# Patient Record
Sex: FEMALE | Race: WHITE | NOT HISPANIC OR LATINO | Employment: FULL TIME | URBAN - METROPOLITAN AREA
[De-identification: names, ages, dates, MRNs, and addresses within clinical notes are randomized per-mention and may not be internally consistent; named-entity substitution may affect disease eponyms.]

---

## 2023-12-07 ENCOUNTER — OFFICE VISIT (OUTPATIENT)
Dept: NEUROSURGERY | Facility: CLINIC | Age: 73
End: 2023-12-07
Payer: COMMERCIAL

## 2023-12-07 VITALS
HEART RATE: 78 BPM | WEIGHT: 120 LBS | HEIGHT: 62 IN | SYSTOLIC BLOOD PRESSURE: 185 MMHG | BODY MASS INDEX: 22.08 KG/M2 | RESPIRATION RATE: 18 BRPM | DIASTOLIC BLOOD PRESSURE: 99 MMHG

## 2023-12-07 DIAGNOSIS — M54.50 LOW BACK PAIN, UNSPECIFIED BACK PAIN LATERALITY, UNSPECIFIED CHRONICITY, UNSPECIFIED WHETHER SCIATICA PRESENT: ICD-10-CM

## 2023-12-07 DIAGNOSIS — M48.061 LUMBAR FORAMINAL STENOSIS: Primary | ICD-10-CM

## 2023-12-07 DIAGNOSIS — M41.9 SCOLIOSIS, UNSPECIFIED SCOLIOSIS TYPE, UNSPECIFIED SPINAL REGION: ICD-10-CM

## 2023-12-07 DIAGNOSIS — G89.29 OTHER CHRONIC BACK PAIN: ICD-10-CM

## 2023-12-07 DIAGNOSIS — M54.16 LUMBAR RADICULOPATHY, CHRONIC: ICD-10-CM

## 2023-12-07 DIAGNOSIS — M54.9 OTHER CHRONIC BACK PAIN: ICD-10-CM

## 2023-12-07 DIAGNOSIS — M41.55 SCOLIOSIS OF THORACOLUMBAR REGION DUE TO DEGENERATIVE DISEASE OF SPINE IN ADULT: ICD-10-CM

## 2023-12-07 PROCEDURE — 99205 OFFICE O/P NEW HI 60 MIN: CPT | Performed by: NEUROLOGICAL SURGERY

## 2023-12-07 PROCEDURE — 99215 OFFICE O/P EST HI 40 MIN: CPT | Performed by: NEUROLOGICAL SURGERY

## 2023-12-07 PROCEDURE — 1159F MED LIST DOCD IN RCRD: CPT | Performed by: NEUROLOGICAL SURGERY

## 2023-12-07 PROCEDURE — 1036F TOBACCO NON-USER: CPT | Performed by: NEUROLOGICAL SURGERY

## 2023-12-07 PROCEDURE — 1125F AMNT PAIN NOTED PAIN PRSNT: CPT | Performed by: NEUROLOGICAL SURGERY

## 2023-12-07 RX ORDER — LANOLIN ALCOHOL/MO/W.PET/CERES
5000 CREAM (GRAM) TOPICAL
COMMUNITY

## 2023-12-07 RX ORDER — PANTOPRAZOLE SODIUM 40 MG/1
40 TABLET, DELAYED RELEASE ORAL
COMMUNITY

## 2023-12-07 RX ORDER — PREGABALIN 75 MG/1
75 CAPSULE ORAL NIGHTLY
COMMUNITY
Start: 2023-11-14 | End: 2024-04-17 | Stop reason: HOSPADM

## 2023-12-07 RX ORDER — BRIMONIDINE TARTRATE AND TIMOLOL MALEATE 2; 5 MG/ML; MG/ML
1 SOLUTION OPHTHALMIC 2 TIMES DAILY
COMMUNITY
End: 2024-03-25 | Stop reason: ALTCHOICE

## 2023-12-07 RX ORDER — ATORVASTATIN CALCIUM 10 MG/1
10 TABLET, FILM COATED ORAL DAILY
COMMUNITY
Start: 2023-08-09

## 2023-12-07 RX ORDER — MELOXICAM 7.5 MG/1
7.5 TABLET ORAL 2 TIMES DAILY
COMMUNITY
Start: 2018-03-01 | End: 2024-04-17 | Stop reason: HOSPADM

## 2023-12-07 RX ORDER — BRIMONIDINE TARTRATE 2 MG/ML
SOLUTION/ DROPS OPHTHALMIC
COMMUNITY
Start: 2023-07-24 | End: 2024-04-11 | Stop reason: ENTERED-IN-ERROR

## 2023-12-07 RX ORDER — CALCIUM CARBONATE 600 MG
TABLET ORAL
COMMUNITY

## 2023-12-07 ASSESSMENT — ENCOUNTER SYMPTOMS
OCCASIONAL FEELINGS OF UNSTEADINESS: 0
LOSS OF SENSATION IN FEET: 1
DEPRESSION: 0

## 2023-12-07 ASSESSMENT — PAIN SCALES - GENERAL: PAINLEVEL: 4

## 2023-12-11 PROBLEM — M54.9 NOTALGIA: Status: ACTIVE | Noted: 2023-12-07

## 2023-12-11 PROBLEM — M54.16 LUMBAR RADICULOPATHY, CHRONIC: Status: ACTIVE | Noted: 2023-12-07

## 2023-12-11 PROBLEM — M41.55 SCOLIOSIS OF THORACOLUMBAR REGION DUE TO DEGENERATIVE DISEASE OF SPINE IN ADULT: Status: ACTIVE | Noted: 2023-12-07

## 2023-12-11 PROBLEM — M48.061 LUMBAR FORAMINAL STENOSIS: Status: ACTIVE | Noted: 2023-12-07

## 2023-12-21 ENCOUNTER — TELEPHONE (OUTPATIENT)
Dept: ORTHOPEDICS | Facility: HOSPITAL | Age: 73
End: 2023-12-21
Payer: COMMERCIAL

## 2023-12-21 NOTE — TELEPHONE ENCOUNTER
I spoke with Ms. Mcnair regarding the CT scan results from June 2022 and also the bone density test that was done in August 2023.  CT scan shows very low HU values of 94 and the bone density results shows evidence of osteoporosis with a T-score of -2.5.  The patient did not need a multilevel lumbar fusion at the least from L2 to pelvis and has significant scoliosis that may mandate extension of her fusion further upwards if her bone quality is going is not that good seems to be based on the CT scan and DEXA results.  Given that I discussed with her the fact that she would benefit from starting anabolic agents for her osteoporosis in the form of either Forteo or Evenity for about 3 to 4 months before surgery and continue that for about a year after the surgery to decrease the chances of instrumentation failure and major surgery related complications especially given the fact that the surgery is elective and she does not have any major neurological deficit at this point.  She is going to talk to her PCP to see if she can be started on anabolic agents for osteoporosis which is the treatment of choice for patient to her undergoing major thoracolumbar instrumentation with the poor bone quality.  We will be more than happy to proceed with surgery if she would want to understanding the risk of failure if we proceed without treating it.  She did not discuss this with her family and call my office and reschedule it if she would agree with starting the treatment before proceeding with surgery with anabolic agents as I would recommend.    I discussed with the patient again on phone couple of days back regarding her upcoming surgery and she has already seen her endocrinologist who has started her on Evenity at this point for osteoporosis.  I also reviewed her AP scoliosis films that shows scoliosis that is much worse but was visualized on previous imaging and she likely might require lower thoracic to pelvis instrumentation and  fusion.  Regardless at this point of time patient is agreeable to take the anabolic agents for few months before surgical intervention given the high risk of complication without.  Will schedule her for surgery in April and I will see her once before surgery likely March again and discussed with her slight change in surgical plan that we may have to consider given her deformity.    Pop Whelan MD, [facility]   of Neurological Surgery  [facility] [employer] [facility]  Attending Surgeon  Director - Minimally Invasive Spine Surgery  [place], [place]      ---Some of this note was completed using Dragon voice recognition technology and sometimes the software misinterprets words. This may include unintended errors with respect to translation of words, typographical errors or grammar errors which may not have been identified prior to finalization of the chart note. Please take this into account when reading this note---

## 2023-12-22 ENCOUNTER — ANCILLARY PROCEDURE (OUTPATIENT)
Dept: RADIOLOGY | Facility: CLINIC | Age: 73
End: 2023-12-22
Payer: COMMERCIAL

## 2023-12-22 DIAGNOSIS — M41.9 SCOLIOSIS, UNSPECIFIED SCOLIOSIS TYPE, UNSPECIFIED SPINAL REGION: ICD-10-CM

## 2023-12-22 PROCEDURE — 72131 CT LUMBAR SPINE W/O DYE: CPT

## 2023-12-22 PROCEDURE — 77080 DXA BONE DENSITY AXIAL: CPT

## 2023-12-22 PROCEDURE — 72081 X-RAY EXAM ENTIRE SPI 1 VW: CPT | Performed by: RADIOLOGY

## 2023-12-22 PROCEDURE — 77080 DXA BONE DENSITY AXIAL: CPT | Performed by: RADIOLOGY

## 2023-12-22 PROCEDURE — 72084 X-RAY EXAM ENTIRE SPI 6/> VW: CPT

## 2023-12-22 PROCEDURE — 72131 CT LUMBAR SPINE W/O DYE: CPT | Performed by: RADIOLOGY

## 2023-12-28 ENCOUNTER — TELEMEDICINE CLINICAL SUPPORT (OUTPATIENT)
Dept: PREADMISSION TESTING | Facility: HOSPITAL | Age: 73
End: 2023-12-28
Payer: COMMERCIAL

## 2024-01-12 ENCOUNTER — APPOINTMENT (OUTPATIENT)
Dept: PREADMISSION TESTING | Facility: HOSPITAL | Age: 74
End: 2024-01-12
Payer: COMMERCIAL

## 2024-01-17 DIAGNOSIS — M41.9 SCOLIOSIS, UNSPECIFIED SCOLIOSIS TYPE, UNSPECIFIED SPINAL REGION: Primary | ICD-10-CM

## 2024-02-14 ENCOUNTER — APPOINTMENT (OUTPATIENT)
Dept: NEUROSURGERY | Facility: HOSPITAL | Age: 74
End: 2024-02-14
Payer: COMMERCIAL

## 2024-03-07 ENCOUNTER — APPOINTMENT (OUTPATIENT)
Dept: NEUROSURGERY | Facility: CLINIC | Age: 74
End: 2024-03-07
Payer: COMMERCIAL

## 2024-03-07 ENCOUNTER — OFFICE VISIT (OUTPATIENT)
Dept: NEUROSURGERY | Facility: CLINIC | Age: 74
End: 2024-03-07
Payer: COMMERCIAL

## 2024-03-07 ENCOUNTER — HOSPITAL ENCOUNTER (OUTPATIENT)
Dept: RADIOLOGY | Facility: HOSPITAL | Age: 74
Discharge: HOME | End: 2024-03-07
Payer: COMMERCIAL

## 2024-03-07 VITALS
HEIGHT: 62 IN | SYSTOLIC BLOOD PRESSURE: 197 MMHG | DIASTOLIC BLOOD PRESSURE: 100 MMHG | RESPIRATION RATE: 20 BRPM | BODY MASS INDEX: 22.08 KG/M2 | HEART RATE: 75 BPM | WEIGHT: 120 LBS

## 2024-03-07 DIAGNOSIS — M54.16 LUMBAR RADICULOPATHY, CHRONIC: ICD-10-CM

## 2024-03-07 DIAGNOSIS — M41.55 SCOLIOSIS OF THORACOLUMBAR REGION DUE TO DEGENERATIVE DISEASE OF SPINE IN ADULT: Primary | ICD-10-CM

## 2024-03-07 DIAGNOSIS — M41.9 SCOLIOSIS, UNSPECIFIED SCOLIOSIS TYPE, UNSPECIFIED SPINAL REGION: ICD-10-CM

## 2024-03-07 PROCEDURE — 99215 OFFICE O/P EST HI 40 MIN: CPT | Performed by: NEUROLOGICAL SURGERY

## 2024-03-07 PROCEDURE — 72082 X-RAY EXAM ENTIRE SPI 2/3 VW: CPT

## 2024-03-07 PROCEDURE — 72082 X-RAY EXAM ENTIRE SPI 2/3 VW: CPT | Performed by: RADIOLOGY

## 2024-03-07 PROCEDURE — 1159F MED LIST DOCD IN RCRD: CPT | Performed by: NEUROLOGICAL SURGERY

## 2024-03-07 PROCEDURE — 1036F TOBACCO NON-USER: CPT | Performed by: NEUROLOGICAL SURGERY

## 2024-03-07 PROCEDURE — 1125F AMNT PAIN NOTED PAIN PRSNT: CPT | Performed by: NEUROLOGICAL SURGERY

## 2024-03-07 NOTE — PROGRESS NOTES
It was a pleasure to see Ms. Xu kee at the Neurosurgery Spine Clinic at [facility]. She is a really nice 73 y.o. female who presents to us with complaints LOW BACK PAIN radiating to both hips. The leg pain and tingling are gone. Has pain going up left side and left foot is numb.    I saw her in the past for her lumbar spinal stenosis and degenerative scoliosis and recommended surgery in the form of decompression and osteotomy with L2 to pelvis instrumented fusion.  Unfortunately her bone quality was not optimal and I recommended her to start anabolic agent and she has been on Evenity over the past couple of months.  She has already undergone 1 dose of the same and is scheduled to get another dose in a day or 2 and should likely receive the third dose before her surgery that is scheduled for epidural.  She is here to discuss surgery further and to update on clinical condition.    She mentioned that she continues to have pain involving the low back and bilateral lower extremities especially along the buttocks and has numbness and tingling in the foot.  She also feels that she is leaning slightly to the left.  However overall she denies any new symptoms beyond what she already had when I saw her in the past.      Constitutional: No fever or chills  Respiratory: No shortness of breath or wheezing  Musculoskeletal: see HPI above   Neurologic: See HPI above    There were no vitals filed for this visit.  NEURO: Neurologically patient is awake alert and oriented X 3    No obvious cranial nerve deficit.  Strength is almost 5/5 throughout all motor groups tested with no asymmetric significant motor deficit.  Deep tendon reflexes are symmetric throughout.   Gait : Walks with a hunched forward posture secondary to pain and the gait is very antalgic.  Also has a slightly leftward tilt on standing upright.  Sensory examination is intact to touch and painful stimuli.    MRI of the lumbar spine done in November 2023  was reviewed personally and shows presence of significant degenerative changes from L2-3 down to L5-S1 in the form of disc degeneration at L2-3 with mild disc bulges but more significant to be presence of severe right L3-4 lateral recess and foraminal stenosis and left L4-5 and left L5-S1 there is severe lateral recess stenosis and foraminal stenosis secondary to degenerative changes most specifically being severe facet hypertrophy.  There is evidence of degenerative scoliosis.  AP and lateral x-ray of the lumbar spine done in thousand 22 were also reviewed and shows presence of degenerative scoliosis is specially presence of left-sided lumbosacral fractional curve measuring about 15 degrees from L3-S1.  More specifically there is evidence of her almost complete disc collapse with degeneration at L3-4 L4-5 L5-S1 with severe foraminal stenosis at all the levels.  Her lumbar lordosis measures about 42 degrees which seems to be in alignment with the pelvic incidence measures about 48 degrees.  There is no evidence of any significant sagittal deformity.     AP and lateral conservative scoliosis films that were done after her last visit with me and today were reviewed personally and shows evidence of ability stalking scoliosis more than what was visualized on the lumbar x-rays with about 43 degrees of Hurd angle measured between L1 and L5 with about 22 degrees of L5-S1 fractional curve.  The convexity of the pelvis to the left.  There is clear evidence of left-sided coronal malalignment.  Measuring about 3 cm.    PLAN:  Ashley Mcnair is a really nice 73 y.o. female who is already very well-known to me who I evaluated in the past for her significant low back and lower extremity pain.  I recommended surgery for her during her last visit and she was already on schedule few months back but unfortunately due to her osteoporosis I had to start him on anabolic agents to optimize him overall bone health to improve her outcome  from surgery.I already reviewed I also ordered a scoliosis films for her today and she is here to review the films and to discuss the surgery again.  .  Her surgery already has been approved by the insurance and from L2 to pelvis fusion with osteotomy and decompression which I believe is appropriate.  The only code that was denied was use of BMP and I did discuss with her the pros and cons of the same.  We will decide intraoperatively if he would need to use BMP or not.    I have reviewed imaging and diagnosis with the patient and her family during her to the visit today, discussed the natural history of the disease and both non-operative and operative treatments available and rationale vs risks for both.   I specifically discussed again with her the scoliosis films that clearly shows a much worse scoliosis than which was visualized on the previous x-rays.  Hence given the extent of the scoliosis we will extend her fusion up to T10 but other than that the surgery would pretty much remained the same as I recommended to her in the past.     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  She is already taking Evenity and she will have completed 3 doses of the same before her surgery that is scheduled on April 12.       I have explained the surgical procedure in detail, duration of surgery ( 6-8 hrs ) with expected duration and extent of recovery along risks of surgery that include, but is not limited to bleeding, infection, blood vessel injury or damage,loss  of sensation, loss of bladder, bowel or sexual function, nerve injury/damage resulting in weakness/paralysis, malunion, nonunion, CSF leak, brachial plexus injury, peripheral vision blindness, injury to the abdominal contents, failure of implants/fusion, failure to relieve symptoms, recurrent disease, adjacent segment disease, need to reoperate for any reason and general anesthesia reaction such as stroke, coma, heart attack, delirium, confusion, death as well as worsening of preexisted medical conditions and any other unforeseen complication related to unrelated to the spinal procedure per se.  Specifically discussed were implant related failures such as carlos fracture and Proximal Junctional Kyphosis (PJK) and proximal junctional failure (PJF) with possibility of requiring revision surgery in the form of extension of fusion if that were to happen especially symptomatic up to an extent of about 15 % - 20 %.  The limitations secondary to stiffness in the spine despite a successful surgery from long segment fusion impacting day to day life was also discussed.   A Shared decision was made to proceed with surgery after involving the patient and her family in the treatment decision-making process.  They clearly expressed understanding of possible risks and benefits of surgery and the realistic expectations of surgery along with the fact that the goal of the surgery is to improve the  overall functioning and quality of life by improvement of the current level of function,  possible improvement and/or prevent progression of preexisting neurological deficits and not necessarily 100 % pain relief. There is no guarantee that the prexisiting deficits will improve definitely after surgery. The option of continued non-operative management was very clearly discussed as well with its associated risks and benefits and the patient was clearly provided that option.       It was a pleasure to participate in Ms. Mcnair clinical care.  All questions were answered to her satisfaction and she explained understanding of the further treatment plan.     We will proceed with surgery on April 12 as planned.      Pop Whelan MD, [facility]   of Neurological Surgery  [facility] [employer]  Attending Surgeon  Director - Minimally Invasive Spine Surgery  [place], [place]      ---Some of this note was completed using Dragon voice recognition technology and sometimes the software misinterprets words. This may include unintended errors with respect to translation of words, typographical errors or grammar errors which may not have been identified prior to finalization of the chart note. Please take this into account when reading this note---

## 2024-03-25 ENCOUNTER — PRE-ADMISSION TESTING (OUTPATIENT)
Dept: PREADMISSION TESTING | Facility: HOSPITAL | Age: 74
End: 2024-03-25
Payer: COMMERCIAL

## 2024-03-25 ENCOUNTER — HOSPITAL ENCOUNTER (OUTPATIENT)
Dept: CARDIOLOGY | Facility: HOSPITAL | Age: 74
Discharge: HOME | End: 2024-03-25
Payer: COMMERCIAL

## 2024-03-25 VITALS
BODY MASS INDEX: 21.42 KG/M2 | HEIGHT: 63 IN | TEMPERATURE: 98.7 F | DIASTOLIC BLOOD PRESSURE: 82 MMHG | WEIGHT: 120.9 LBS | HEART RATE: 81 BPM | SYSTOLIC BLOOD PRESSURE: 150 MMHG | OXYGEN SATURATION: 98 %

## 2024-03-25 DIAGNOSIS — Z01.818 PREOPERATIVE EXAMINATION: Primary | ICD-10-CM

## 2024-03-25 DIAGNOSIS — E78.5 HYPERLIPIDEMIA, UNSPECIFIED HYPERLIPIDEMIA TYPE: ICD-10-CM

## 2024-03-25 DIAGNOSIS — M41.9 SCOLIOSIS, UNSPECIFIED SCOLIOSIS TYPE, UNSPECIFIED SPINAL REGION: ICD-10-CM

## 2024-03-25 DIAGNOSIS — Z01.818 PREOPERATIVE EXAMINATION: ICD-10-CM

## 2024-03-25 LAB
ABO GROUP (TYPE) IN BLOOD: NORMAL
ANION GAP SERPL CALC-SCNC: 13 MMOL/L (ref 10–20)
ANTIBODY SCREEN: NORMAL
APTT PPP: 40 SECONDS (ref 27–38)
BUN SERPL-MCNC: 21 MG/DL (ref 6–23)
CALCIUM SERPL-MCNC: 10.2 MG/DL (ref 8.6–10.6)
CHLORIDE SERPL-SCNC: 102 MMOL/L (ref 98–107)
CO2 SERPL-SCNC: 30 MMOL/L (ref 21–32)
CREAT SERPL-MCNC: 0.52 MG/DL (ref 0.5–1.05)
EGFRCR SERPLBLD CKD-EPI 2021: >90 ML/MIN/1.73M*2
ERYTHROCYTE [DISTWIDTH] IN BLOOD BY AUTOMATED COUNT: 12.9 % (ref 11.5–14.5)
EST. AVERAGE GLUCOSE BLD GHB EST-MCNC: 105 MG/DL
GLUCOSE SERPL-MCNC: 76 MG/DL (ref 74–99)
HBA1C MFR BLD: 5.3 %
HCT VFR BLD AUTO: 44.4 % (ref 36–46)
HGB BLD-MCNC: 14.5 G/DL (ref 12–16)
INR PPP: 1.1 (ref 0.9–1.1)
MCH RBC QN AUTO: 31.4 PG (ref 26–34)
MCHC RBC AUTO-ENTMCNC: 32.7 G/DL (ref 32–36)
MCV RBC AUTO: 96 FL (ref 80–100)
NRBC BLD-RTO: 0 /100 WBCS (ref 0–0)
PLATELET # BLD AUTO: 311 X10*3/UL (ref 150–450)
POTASSIUM SERPL-SCNC: 4.2 MMOL/L (ref 3.5–5.3)
PROTHROMBIN TIME: 12.2 SECONDS (ref 9.8–12.8)
RBC # BLD AUTO: 4.62 X10*6/UL (ref 4–5.2)
RH FACTOR (ANTIGEN D): NORMAL
SODIUM SERPL-SCNC: 141 MMOL/L (ref 136–145)
WBC # BLD AUTO: 5.5 X10*3/UL (ref 4.4–11.3)

## 2024-03-25 PROCEDURE — 36415 COLL VENOUS BLD VENIPUNCTURE: CPT

## 2024-03-25 PROCEDURE — 93005 ELECTROCARDIOGRAM TRACING: CPT

## 2024-03-25 PROCEDURE — 86901 BLOOD TYPING SEROLOGIC RH(D): CPT

## 2024-03-25 PROCEDURE — 85610 PROTHROMBIN TIME: CPT

## 2024-03-25 PROCEDURE — 99204 OFFICE O/P NEW MOD 45 MIN: CPT | Performed by: NURSE PRACTITIONER

## 2024-03-25 PROCEDURE — 87081 CULTURE SCREEN ONLY: CPT

## 2024-03-25 PROCEDURE — 93010 ELECTROCARDIOGRAM REPORT: CPT | Performed by: INTERNAL MEDICINE

## 2024-03-25 PROCEDURE — 85027 COMPLETE CBC AUTOMATED: CPT

## 2024-03-25 PROCEDURE — 80048 BASIC METABOLIC PNL TOTAL CA: CPT

## 2024-03-25 PROCEDURE — 83036 HEMOGLOBIN GLYCOSYLATED A1C: CPT

## 2024-03-25 RX ORDER — CHLORHEXIDINE GLUCONATE 40 MG/ML
SOLUTION TOPICAL 2 TIMES DAILY
Qty: 473 ML | Refills: 0 | Status: SHIPPED | OUTPATIENT
Start: 2024-03-25 | End: 2024-03-30

## 2024-03-25 RX ORDER — CHLORHEXIDINE GLUCONATE ORAL RINSE 1.2 MG/ML
SOLUTION DENTAL
Qty: 473 ML | Refills: 0 | Status: SHIPPED | OUTPATIENT
Start: 2024-03-25 | End: 2024-04-17 | Stop reason: HOSPADM

## 2024-03-25 ASSESSMENT — DUKE ACTIVITY SCORE INDEX (DASI)
CAN YOU DO YARD WORK LIKE RAKING LEAVES, WEEDING OR PUSHING A MOWER: YES
CAN YOU RUN A SHORT DISTANCE: NO
CAN YOU DO MODERATE WORK AROUND THE HOUSE LIKE VACUUMING, SWEEPING FLOORS OR CARRYING GROCERIES: YES
CAN YOU TAKE CARE OF YOURSELF (EAT, DRESS, BATHE, OR USE TOILET): YES
CAN YOU CLIMB A FLIGHT OF STAIRS OR WALK UP A HILL: YES
CAN YOU WALK A BLOCK OR TWO ON LEVEL GROUND: YES
CAN YOU WALK INDOORS, SUCH AS AROUND YOUR HOUSE: YES
CAN YOU HAVE SEXUAL RELATIONS: YES
CAN YOU DO HEAVY WORK AROUND THE HOUSE LIKE SCRUBBING FLOORS OR LIFTING AND MOVING HEAVY FURNITURE: NO
DASI METS SCORE: 6.3
CAN YOU PARTICIPATE IN MODERATE RECREATIONAL ACTIVITIES LIKE GOLF, BOWLING, DANCING, DOUBLES TENNIS OR THROWING A BASEBALL OR FOOTBALL: NO
TOTAL_SCORE: 28.7
CAN YOU DO LIGHT WORK AROUND THE HOUSE LIKE DUSTING OR WASHING DISHES: YES
CAN YOU PARTICIPATE IN STRENOUS SPORTS LIKE SWIMMING, SINGLES TENNIS, FOOTBALL, BASKETBALL, OR SKIING: NO

## 2024-03-25 ASSESSMENT — LIFESTYLE VARIABLES: SMOKING_STATUS: NONSMOKER

## 2024-03-25 ASSESSMENT — ENCOUNTER SYMPTOMS
CONSTITUTIONAL NEGATIVE: 1
RESPIRATORY NEGATIVE: 1
CARDIOVASCULAR NEGATIVE: 1
NECK NEGATIVE: 1
NUMBNESS: 1
GASTROINTESTINAL NEGATIVE: 1
MUSCULOSKELETAL NEGATIVE: 1

## 2024-03-25 NOTE — H&P (VIEW-ONLY)
"CPM/PAT Evaluation       Name: Ashley Mcnair (Ashley Mcnair \"Alysa\")  /Age: 1950/73 y.o.     Visit Type:   In-Person       Chief Complaint: lumbar spinal stenosis scheduled for preoperative visit    HPI  Patient is a 72yo F with scheduled L3-L4 laminectomy and facetectomy for decompression, L4-L5 posterior column osteotomy, L5-S1 laminectomy and facetectomy for decompression  with L4-5 and L5-S1 posterior lumbar interbody fusion with bone autograft and BMP with titanium interbody cage placement and L2 S1 instrumentation and fusion with pelvic fixation on 2024 for treatment of lumbar spinal stenosis.  The patient is referred by Dr. Pop Davis for osteoarthritis, fibromyalgia, GERD, hyperlipidemia, hypertension, remote nephrolithiasis, postoperative nausea and vomiting, spinal stenosis scheduled for surgery.    Past Medical History:   Diagnosis Date    Arthritis     Back pain     Right L3-L4 Transforaminal Epidural Steroid injection under fluoroscopic guidance on 23    Chronic pain disorder     Corneal abrasion     Degenerative scoliosis     Fibromyalgia, primary     controlled on mobic    GERD (gastroesophageal reflux disease)     managed with protonix    History of shingles     Hyperlipidemia     f/w pcp, managed with Lipitor    Hypertension     f/w pcp, not currently on medication    Nephrolithiasis     passed naturally    PONV (postoperative nausea and vomiting)     Radiculopathy     Scheduled for surgery with Dr. Whelan on 24    Retinal detachment     Shingles     Spinal stenosis     Tinnitus of left ear     Vertigo     Vision loss     left eye d/t injury       Past Surgical History:   Procedure Laterality Date    ADENOIDECTOMY       SECTION, LOW TRANSVERSE      x3 , ,     EYE SURGERY Left     pupil repair    FOOT Right     HYSTERECTOMY      TONSILLECTOMY         Patient Sexual activity questions deferred to the physician.    Family History   Problem " Relation Name Age of Onset    No Known Problems Mother      Heart disease Father         Allergies   Allergen Reactions    Sulfa (Sulfonamide Antibiotics) Swelling       Prior to Admission medications    Medication Sig Start Date End Date Taking? Authorizing Provider   atorvastatin (Lipitor) 10 mg tablet Take 1 tablet (10 mg) by mouth once daily. 8/9/23  Yes Historical Provider, MD   brimonidine (AlphaGAN) 0.2 % ophthalmic solution INSTILL 1 DROP INTO THE LEFT EYE TWICE A DAY 7/24/23  Yes Historical Provider, MD   calcium carbonate 600 mg calcium (1,500 mg) tablet 1 (one) time each day at the same time.   Yes Historical Provider, MD   calcium citrate (Calcitrate) 200 mg (950 mg) tablet Take 1 tablet (200 mg) by mouth once daily.   Yes Historical Provider, MD   cranberry 400 mg capsule Take by mouth.   Yes Historical Provider, MD   cyanocobalamin (Vitamin B-12) 1,000 mcg tablet Take 5 tablets (5,000 mcg) by mouth once daily.   Yes Historical Provider, MD   meloxicam (Mobic) 7.5 mg tablet Take 1 tablet (7.5 mg) by mouth twice a day. 3/1/18  Yes Historical Provider, MD   milk thistle 175 mg tablet Take 1 tablet (175 mg) by mouth once daily.   Yes Historical Provider, MD   pantoprazole (ProtoNix) 40 mg EC tablet Take 1 tablet (40 mg) by mouth once daily.   Yes Historical Provider, MD   pregabalin (Lyrica) 25 mg capsule Take 1 capsule (25 mg) by mouth once daily. 11/14/23 3/25/24 Yes Historical Provider, MD   chlorhexidine (Hibiclens) 4 % external liquid Apply topically 2 times a day for 5 days. 3/25/24 3/30/24  Samantha A Meeson, APRN-CNP   chlorhexidine (Peridex) 0.12 % solution Swish and spit 15 mL night before surgery and morning of surgery 3/25/24   Samantha A Meeson, APRN-CNP   romosozumab-aqqg (Evenity) 105 mg/1.17 mL syringe Inject under the skin.    Historical Provider, MD   brimonidine-timoloL (Combigan) 0.2-0.5 % ophthalmic solution Administer 1 drop into affected eye(s) twice a day.  3/25/24  Historical  Provider, MD LOPEZ ROS:   Constitutional:   neg    Neuro/Psych:    numbness  Eyes:    use of corrective lenses (contacts)  Ears:   neg    Nose:   neg    Mouth:   neg    Throat:   neg    Neck:   neg    Cardio:   neg    Respiratory:   neg    Endocrine:   GI:   neg    :   neg    Musculoskeletal:   neg    Hematologic:   neg    Skin:  neg        Physical Exam  Vitals reviewed.   Constitutional:       Appearance: Normal appearance.   HENT:      Head: Normocephalic.      Mouth/Throat:      Mouth: Mucous membranes are dry.   Eyes:      Conjunctiva/sclera: Conjunctivae normal.   Neck:      Vascular: No carotid bruit.   Cardiovascular:      Rate and Rhythm: Normal rate and regular rhythm.      Pulses: Normal pulses.      Heart sounds: Normal heart sounds.   Pulmonary:      Effort: Pulmonary effort is normal.      Breath sounds: Normal breath sounds.   Abdominal:      Palpations: Abdomen is soft.      Tenderness: There is no abdominal tenderness.   Musculoskeletal:         General: Normal range of motion.      Cervical back: Normal range of motion.      Right lower leg: No edema.      Left lower leg: No edema.   Lymphadenopathy:      Cervical: No cervical adenopathy.   Skin:     General: Skin is warm and dry.      Capillary Refill: Capillary refill takes less than 2 seconds.   Neurological:      General: No focal deficit present.      Mental Status: She is alert and oriented to person, place, and time.   Psychiatric:         Mood and Affect: Mood normal.         Behavior: Behavior normal.         Thought Content: Thought content normal.         Judgment: Judgment normal.          PAT AIRWAY:   Airway:     Mallampati::  I    Neck ROM::  Full  normal        Visit Vitals  /82 Comment: manual recheck   Pulse 81   Temp 37.1 °C (98.7 °F)       DASI Risk Score      Flowsheet Row Most Recent Value   DASI SCORE 28.7   METS Score (Will be calculated only when all the questions are answered) 6.3          Caprini DVT  Assessment      Flowsheet Row Most Recent Value   DVT Score 9   Current Status Major surgery planned, lasting over 3 hours   History Prior major surgery   Age 60-75 years   BMI 30 or less          Modified Frailty Index      Flowsheet Row Most Recent Value   Modified Frailty Index Calculator 0          CHADS2 Stroke Risk         N/A 3 - 100%: High Risk   2 - 3%: Medium Risk   0 - 2%: Low Risk     Last Change: N/A          This score determines the patient's risk of having a stroke if the patient has atrial fibrillation.        This score is not applicable to this patient. Components are not calculated.          Revised Cardiac Risk Index      Flowsheet Row Most Recent Value   Revised Cardiac Risk Calculator 0          Apfel Simplified Score      Flowsheet Row Most Recent Value   Apfel Simplified Score Calculator 4          Risk Analysis Index Results This Encounter    No data found in the last 1 encounters.       Stop Bang Score      Flowsheet Row Most Recent Value   Do you snore loudly? 0   Do you often feel tired or fatigued after your sleep? 0   Has anyone ever observed you stop breathing in your sleep? 0   Do you have or are you being treated for high blood pressure? 0   Recent BMI (Calculated) 21.9   Is BMI greater than 35 kg/m2? 0=No   Age older than 50 years old? 1=Yes   Is your neck circumference greater than 17 inches (Male) or 16 inches (Female)? 0   Gender - Male 0=No   STOP-BANG Total Score 1              Assessment and Plan:     Neuro:  Fibromyalgia controlled with lyrica (continue)    the patient is at an increased risk for post operative delirium secondary to age >/= 65 and type and duration of surgery.  Preoperative brain exercise educational handout provided to patient.    The patient is at an increased risk for perioperative stroke secondary to increased age, HTN, HLD, female sex , general anesthesia, and op time >2.5 hours.     HEENT/Airway:  No diagnosis or significant findings on chart review or  clinical presentation and evaluation.     Cardiovascular:   HLD managed on atorvastatin.  Hypertension, patient not currently on medication, patient plans on seeing PCP on 3/27/2024, will call with medication recommendations.  EKG in office today NSR with RBBB. no additional preoperative testing is currently indicated.    Update 24-by PCP on 2024 was started on 50 mg of losartan daily.  Discussed with patient to keep track of blood pressure log and to call PCP if pressure continues to run high.  Instructed patient to hold losartan 24 hours prior to surgery.    METS are 6.3    RCRI  0 which is 3.9% % 30 day risk of MACE (risk for cardiac death, nonfatal myocardial infarction, and nonfactal cardiac arrest    CONNOR score which indicates a  0.1 % risk of intraoperative or 30-day postoperative MACE      Pulmonary:  No diagnosis however significant findings on chart review or clinical presentation and evaluation see below risk screening tools. Preoperative deep breathing educational handout provided to patient.    ARISCAT:   26   points which is a intermediate (13.3%) risk of in-hospital post-op pulmonary complications     PRODIGY:  12  points which is a intermediate risk of post op opioid induced respiratory depression episodes    STOP BAN  points which is a low risk for moderate to severe BERTO    Renal:  No diagnosis or significant findings on chart review or clinical presentation and evaluation, however, the patient is at increased risk of perioperative renal complications secondary to age>/= 56 and HTN. Preventative measures include preoperative hydration and BP control.    Endocrine:  No diagnosis or significant findings on chart review or clinical presentation and evaluation.    Hematologic:   No diagnosis or significant findings on chart review or clinical presentation and evaluation. Preoperative DVT educational handout provided to patient.    Caprini Score:  9  points which is a highest risk of  perioperative VTE    Gastrointestinal:   GERD controlled with diet and pantoprazole    EAT-10 score of      0- self-perceived oropharyngeal dysphagia scale (0-40)     Apfel: 4 points 79%% risk for post operative N/V    Infectious disease:  No diagnosis or significant findings on chart review or clinical presentation and evaluation.      Musculoskeletal:  OA currently on Mobic hold for 7 days prior to surgery; spinal stenosis scheduled for surgery     Other:   postoperative nausea and vomiting, consider antiemetic therapy      Labs ordered  Recent Results (from the past 168 hour(s))   CBC    Collection Time: 03/25/24  1:43 PM   Result Value Ref Range    WBC 5.5 4.4 - 11.3 x10*3/uL    nRBC 0.0 0.0 - 0.0 /100 WBCs    RBC 4.62 4.00 - 5.20 x10*6/uL    Hemoglobin 14.5 12.0 - 16.0 g/dL    Hematocrit 44.4 36.0 - 46.0 %    MCV 96 80 - 100 fL    MCH 31.4 26.0 - 34.0 pg    MCHC 32.7 32.0 - 36.0 g/dL    RDW 12.9 11.5 - 14.5 %    Platelets 311 150 - 450 x10*3/uL   Basic Metabolic Panel    Collection Time: 03/25/24  1:43 PM   Result Value Ref Range    Glucose 76 74 - 99 mg/dL    Sodium 141 136 - 145 mmol/L    Potassium 4.2 3.5 - 5.3 mmol/L    Chloride 102 98 - 107 mmol/L    Bicarbonate 30 21 - 32 mmol/L    Anion Gap 13 10 - 20 mmol/L    Urea Nitrogen 21 6 - 23 mg/dL    Creatinine 0.52 0.50 - 1.05 mg/dL    eGFR >90 >60 mL/min/1.73m*2    Calcium 10.2 8.6 - 10.6 mg/dL   Hemoglobin A1C    Collection Time: 03/25/24  1:43 PM   Result Value Ref Range    Hemoglobin A1C 5.3 see below %    Estimated Average Glucose 105 Not Established mg/dL   Type And Screen    Collection Time: 03/25/24  1:43 PM   Result Value Ref Range    ABO TYPE A     Rh TYPE POS     ANTIBODY SCREEN NEG    Coagulation Screen    Collection Time: 03/25/24  1:43 PM   Result Value Ref Range    Protime 12.2 9.8 - 12.8 seconds    INR 1.1 0.9 - 1.1    aPTT 40 (H) 27 - 38 seconds   Staphylococcus aureus/MRSA colonization, Culture    Collection Time: 03/25/24  1:43 PM     Specimen: Nares/Axilla/Groin; Swab   Result Value Ref Range    Staph/MRSA Screen Culture No Staphylococcus aureus isolated    ECG 12 Lead    Collection Time: 03/26/24  1:43 PM   Result Value Ref Range    Ventricular Rate 78 BPM    Atrial Rate 78 BPM    MN Interval 184 ms    QRS Duration 128 ms    QT Interval 410 ms    QTC Calculation(Bazett) 467 ms    P Axis 18 degrees    R Axis 1 degrees    T Axis 34 degrees    QRS Count 13 beats    Q Onset 219 ms    P Onset 127 ms    P Offset 178 ms    T Offset 424 ms    QTC Fredericia 447 ms

## 2024-03-25 NOTE — PREPROCEDURE INSTRUCTIONS
Thank you for visiting the [facility].  If you have any changes to your health condition, please call the surgeons office to alert them and give them details of your symptoms.    Samantha Meeson, MSN, NP-C  Adult-Gerontology Nurse Practitioner II  Department of Anesthesiology and Perioperative Medicine  Main phone [phone]  Direct phone [phone]  Fax [phone]    Preoperative Fasting Guidelines    Why must I stop eating and drinking near surgery time?  With sedation, food or liquid in your stomach can enter your lungs causing serious complications  Increases nausea and vomiting    When do I need to stop eating and drinking before my surgery?  Do not eat any food after midnight the night before your surgery/procedure.  You may have up to TEN ounces of clear liquid until TWO hours before your instructed arrival time to the hospital.  This includes water, black tea/coffee, (no milk or cream) apple juice, and electrolyte drinks (Gatorade)  You may chew gum until TWO hours before your surgery/procedure      Additional Instructions:     The Day before Surgery:  -Review your medication instructions, stop indicated medications  -You will be contacted in the evening regarding the time of your arrival to facility and surgery time    Day of Surgery:  -Review your medication instructions, take indicated medications  -Wear comfortable loose fitting clothing  -Do not use moisturizers, creams, lotions or perfume  -All jewelry and valuables should be left at home              Preoperative Brain Exercises    What are brain exercises?  A brain exercise is any activity that engages your thinking (cognitive) skills.    What types of activities are considered brain exercises?  Jigsaw puzzles, crossword puzzles, word jumble, memory games, word search, and many more.  Many can be found free online or on your phone via a mobile lucille.    Why should I do brain exercises before my surgery?  More recent  research has shown brain exercise before surgery can lower the risk of postoperative delirium (confusion) which can be especially important for older adults.  Patients who did brain exercises for 5 to 10 hours the days before surgery, cut their risk of postoperative delirium in half up to 1 week after surgery.                  [facility]    Preoperative Deep Breathing Exercises    Why it is important to do deep breathing exercises before my surgery?  Deep breathing exercises strengthen your breathing muscles.  This helps you to recover after your surgery and decreases the chance of breathing complications.      How are the deep breathing exercises done?  Sit straight with your back supported.  Breathe in deeply and slowly through your nose. Your lower rib cage should expand and your abdomen may move forward.  Hold that breath for 3 to 5 seconds.  Breathe out through pursed lips, slowly and completely.  Rest and repeat 10 times every hour while awake.  Rest longer if you become dizzy or lightheaded.                Patient and Family Education             Ways You Can Help Prevent Blood Clots             This handout explains some simple things you can do to help prevent blood clots.      Blood clots are blockages that can form in the body's veins. When a blood clot forms in your deep veins, it may be called a deep vein thrombosis, or DVT for short. Blood clots can happen in any part of the body where blood flows, but they are most common in the arms and legs. If a piece of a blood clot breaks free and travels to the lungs, it is called a pulmonary embolus (PE). A PE can be a very serious problem.         Being in the hospital or having surgery can raise your chances of getting a blood clot because you may not be well enough to move around as much as you normally do.         Ways you can help prevent blood clots in the hospital         Wearing SCDs. SCDs stands for Sequential Compression  Devices.   SCDs are special sleeves that wrap around your legs  They attach to a pump that fills them with air to gently squeeze your legs every few minutes.   This helps return the blood in your legs to your heart.   SCDs should only be taken off when walking or bathing.   SCDs may not be comfortable, but they can help save your life.               Wearing compression stockings - if your doctor orders them. These special snug fitting stockings gently squeeze your legs to help blood flow.       Walking. Walking helps move the blood in your legs.   If your doctor says it is ok, try walking the halls at least   5 times a day. Ask us to help you get up, so you don't fall.      Taking any blood thinning medicines your doctor orders.        Page 1 of 2     [facility]; 3/23   Ways you can help prevent blood clots at home       Wearing compression stockings - if your doctor orders them. ? Walking - to help move the blood in your legs.       Taking any blood thinning medicines your doctor orders.      Signs of a blood clot or PE      Tell your doctor or nurse know right away if you have of the problems listed below.    If you are at home, seek medical care right away. Call 911 for chest pain or problems breathing.               Signs of a blood clot (DVT) - such as pain,  swelling, redness or warmth in your arm or leg      Signs of a pulmonary embolism (PE) - such as chest     pain or feeling short of breath

## 2024-03-25 NOTE — CPM/PAT H&P
"CPM/PAT Evaluation       Name: Ashley Mcnair (Ashley Mcnair \"Alysa\")  /Age: 1950/73 y.o.     Visit Type:   In-Person       Chief Complaint: lumbar spinal stenosis scheduled for preoperative visit    HPI  Patient is a 74yo F with scheduled L3-L4 laminectomy and facetectomy for decompression, L4-L5 posterior column osteotomy, L5-S1 laminectomy and facetectomy for decompression  with L4-5 and L5-S1 posterior lumbar interbody fusion with bone autograft and BMP with titanium interbody cage placement and L2 S1 instrumentation and fusion with pelvic fixation on 2024 for treatment of lumbar spinal stenosis.  The patient is referred by Dr. Pop Davis for osteoarthritis, fibromyalgia, GERD, hyperlipidemia, hypertension, remote nephrolithiasis, postoperative nausea and vomiting, spinal stenosis scheduled for surgery.    Past Medical History:   Diagnosis Date    Arthritis     Back pain     Right L3-L4 Transforaminal Epidural Steroid injection under fluoroscopic guidance on 23    Chronic pain disorder     Corneal abrasion     Degenerative scoliosis     Fibromyalgia, primary     controlled on mobic    GERD (gastroesophageal reflux disease)     managed with protonix    History of shingles     Hyperlipidemia     f/w pcp, managed with Lipitor    Hypertension     f/w pcp, not currently on medication    Nephrolithiasis     passed naturally    PONV (postoperative nausea and vomiting)     Radiculopathy     Scheduled for surgery with Dr. Whelan on 24    Retinal detachment     Shingles     Spinal stenosis     Tinnitus of left ear     Vertigo     Vision loss     left eye d/t injury       Past Surgical History:   Procedure Laterality Date    ADENOIDECTOMY       SECTION, LOW TRANSVERSE      x3 , ,     EYE SURGERY Left     pupil repair    FOOT Right     HYSTERECTOMY      TONSILLECTOMY         Patient Sexual activity questions deferred to the physician.    Family History   Problem " Relation Name Age of Onset    No Known Problems Mother      Heart disease Father         Allergies   Allergen Reactions    Sulfa (Sulfonamide Antibiotics) Swelling       Prior to Admission medications    Medication Sig Start Date End Date Taking? Authorizing Provider   atorvastatin (Lipitor) 10 mg tablet Take 1 tablet (10 mg) by mouth once daily. 8/9/23  Yes Historical Provider, MD   brimonidine (AlphaGAN) 0.2 % ophthalmic solution INSTILL 1 DROP INTO THE LEFT EYE TWICE A DAY 7/24/23  Yes Historical Provider, MD   calcium carbonate 600 mg calcium (1,500 mg) tablet 1 (one) time each day at the same time.   Yes Historical Provider, MD   calcium citrate (Calcitrate) 200 mg (950 mg) tablet Take 1 tablet (200 mg) by mouth once daily.   Yes Historical Provider, MD   cranberry 400 mg capsule Take by mouth.   Yes Historical Provider, MD   cyanocobalamin (Vitamin B-12) 1,000 mcg tablet Take 5 tablets (5,000 mcg) by mouth once daily.   Yes Historical Provider, MD   meloxicam (Mobic) 7.5 mg tablet Take 1 tablet (7.5 mg) by mouth twice a day. 3/1/18  Yes Historical Provider, MD   milk thistle 175 mg tablet Take 1 tablet (175 mg) by mouth once daily.   Yes Historical Provider, MD   pantoprazole (ProtoNix) 40 mg EC tablet Take 1 tablet (40 mg) by mouth once daily.   Yes Historical Provider, MD   pregabalin (Lyrica) 25 mg capsule Take 1 capsule (25 mg) by mouth once daily. 11/14/23 3/25/24 Yes Historical Provider, MD   chlorhexidine (Hibiclens) 4 % external liquid Apply topically 2 times a day for 5 days. 3/25/24 3/30/24  Samantha A Meeson, APRN-CNP   chlorhexidine (Peridex) 0.12 % solution Swish and spit 15 mL night before surgery and morning of surgery 3/25/24   Samantha A Meeson, APRN-CNP   romosozumab-aqqg (Evenity) 105 mg/1.17 mL syringe Inject under the skin.    Historical Provider, MD   brimonidine-timoloL (Combigan) 0.2-0.5 % ophthalmic solution Administer 1 drop into affected eye(s) twice a day.  3/25/24  Historical  Provider, MD LOPEZ ROS:   Constitutional:   neg    Neuro/Psych:    numbness  Eyes:    use of corrective lenses (contacts)  Ears:   neg    Nose:   neg    Mouth:   neg    Throat:   neg    Neck:   neg    Cardio:   neg    Respiratory:   neg    Endocrine:   GI:   neg    :   neg    Musculoskeletal:   neg    Hematologic:   neg    Skin:  neg        Physical Exam  Vitals reviewed.   Constitutional:       Appearance: Normal appearance.   HENT:      Head: Normocephalic.      Mouth/Throat:      Mouth: Mucous membranes are dry.   Eyes:      Conjunctiva/sclera: Conjunctivae normal.   Neck:      Vascular: No carotid bruit.   Cardiovascular:      Rate and Rhythm: Normal rate and regular rhythm.      Pulses: Normal pulses.      Heart sounds: Normal heart sounds.   Pulmonary:      Effort: Pulmonary effort is normal.      Breath sounds: Normal breath sounds.   Abdominal:      Palpations: Abdomen is soft.      Tenderness: There is no abdominal tenderness.   Musculoskeletal:         General: Normal range of motion.      Cervical back: Normal range of motion.      Right lower leg: No edema.      Left lower leg: No edema.   Lymphadenopathy:      Cervical: No cervical adenopathy.   Skin:     General: Skin is warm and dry.      Capillary Refill: Capillary refill takes less than 2 seconds.   Neurological:      General: No focal deficit present.      Mental Status: She is alert and oriented to person, place, and time.   Psychiatric:         Mood and Affect: Mood normal.         Behavior: Behavior normal.         Thought Content: Thought content normal.         Judgment: Judgment normal.          PAT AIRWAY:   Airway:     Mallampati::  I    Neck ROM::  Full  normal        Visit Vitals  /82 Comment: manual recheck   Pulse 81   Temp 37.1 °C (98.7 °F)       DASI Risk Score      Flowsheet Row Most Recent Value   DASI SCORE 28.7   METS Score (Will be calculated only when all the questions are answered) 6.3          Caprini DVT  Assessment      Flowsheet Row Most Recent Value   DVT Score 9   Current Status Major surgery planned, lasting over 3 hours   History Prior major surgery   Age 60-75 years   BMI 30 or less          Modified Frailty Index      Flowsheet Row Most Recent Value   Modified Frailty Index Calculator 0          CHADS2 Stroke Risk         N/A 3 - 100%: High Risk   2 - 3%: Medium Risk   0 - 2%: Low Risk     Last Change: N/A          This score determines the patient's risk of having a stroke if the patient has atrial fibrillation.        This score is not applicable to this patient. Components are not calculated.          Revised Cardiac Risk Index      Flowsheet Row Most Recent Value   Revised Cardiac Risk Calculator 0          Apfel Simplified Score      Flowsheet Row Most Recent Value   Apfel Simplified Score Calculator 4          Risk Analysis Index Results This Encounter    No data found in the last 1 encounters.       Stop Bang Score      Flowsheet Row Most Recent Value   Do you snore loudly? 0   Do you often feel tired or fatigued after your sleep? 0   Has anyone ever observed you stop breathing in your sleep? 0   Do you have or are you being treated for high blood pressure? 0   Recent BMI (Calculated) 21.9   Is BMI greater than 35 kg/m2? 0=No   Age older than 50 years old? 1=Yes   Is your neck circumference greater than 17 inches (Male) or 16 inches (Female)? 0   Gender - Male 0=No   STOP-BANG Total Score 1              Assessment and Plan:     Neuro:  Fibromyalgia controlled with lyrica (continue)    the patient is at an increased risk for post operative delirium secondary to age >/= 65 and type and duration of surgery.  Preoperative brain exercise educational handout provided to patient.    The patient is at an increased risk for perioperative stroke secondary to increased age, HTN, HLD, female sex , general anesthesia, and op time >2.5 hours.     HEENT/Airway:  No diagnosis or significant findings on chart review or  clinical presentation and evaluation.     Cardiovascular:   HLD managed on atorvastatin.  Hypertension, patient not currently on medication, patient plans on seeing PCP on 3/27/2024, will call with medication recommendations.  EKG in office today NSR with RBBB. no additional preoperative testing is currently indicated.    Update 24-by PCP on 2024 was started on 50 mg of losartan daily.  Discussed with patient to keep track of blood pressure log and to call PCP if pressure continues to run high.  Instructed patient to hold losartan 24 hours prior to surgery.    METS are 6.3    RCRI  0 which is 3.9% % 30 day risk of MACE (risk for cardiac death, nonfatal myocardial infarction, and nonfactal cardiac arrest    CONNOR score which indicates a  0.1 % risk of intraoperative or 30-day postoperative MACE      Pulmonary:  No diagnosis however significant findings on chart review or clinical presentation and evaluation see below risk screening tools. Preoperative deep breathing educational handout provided to patient.    ARISCAT:   26   points which is a intermediate (13.3%) risk of in-hospital post-op pulmonary complications     PRODIGY:  12  points which is a intermediate risk of post op opioid induced respiratory depression episodes    STOP BAN  points which is a low risk for moderate to severe BERTO    Renal:  No diagnosis or significant findings on chart review or clinical presentation and evaluation, however, the patient is at increased risk of perioperative renal complications secondary to age>/= 56 and HTN. Preventative measures include preoperative hydration and BP control.    Endocrine:  No diagnosis or significant findings on chart review or clinical presentation and evaluation.    Hematologic:   No diagnosis or significant findings on chart review or clinical presentation and evaluation. Preoperative DVT educational handout provided to patient.    Caprini Score:  9  points which is a highest risk of  perioperative VTE    Gastrointestinal:   GERD controlled with diet and pantoprazole    EAT-10 score of      0- self-perceived oropharyngeal dysphagia scale (0-40)     Apfel: 4 points 79%% risk for post operative N/V    Infectious disease:  No diagnosis or significant findings on chart review or clinical presentation and evaluation.      Musculoskeletal:  OA currently on Mobic hold for 7 days prior to surgery; spinal stenosis scheduled for surgery     Other:   postoperative nausea and vomiting, consider antiemetic therapy      Labs ordered  Recent Results (from the past 168 hour(s))   CBC    Collection Time: 03/25/24  1:43 PM   Result Value Ref Range    WBC 5.5 4.4 - 11.3 x10*3/uL    nRBC 0.0 0.0 - 0.0 /100 WBCs    RBC 4.62 4.00 - 5.20 x10*6/uL    Hemoglobin 14.5 12.0 - 16.0 g/dL    Hematocrit 44.4 36.0 - 46.0 %    MCV 96 80 - 100 fL    MCH 31.4 26.0 - 34.0 pg    MCHC 32.7 32.0 - 36.0 g/dL    RDW 12.9 11.5 - 14.5 %    Platelets 311 150 - 450 x10*3/uL   Basic Metabolic Panel    Collection Time: 03/25/24  1:43 PM   Result Value Ref Range    Glucose 76 74 - 99 mg/dL    Sodium 141 136 - 145 mmol/L    Potassium 4.2 3.5 - 5.3 mmol/L    Chloride 102 98 - 107 mmol/L    Bicarbonate 30 21 - 32 mmol/L    Anion Gap 13 10 - 20 mmol/L    Urea Nitrogen 21 6 - 23 mg/dL    Creatinine 0.52 0.50 - 1.05 mg/dL    eGFR >90 >60 mL/min/1.73m*2    Calcium 10.2 8.6 - 10.6 mg/dL   Hemoglobin A1C    Collection Time: 03/25/24  1:43 PM   Result Value Ref Range    Hemoglobin A1C 5.3 see below %    Estimated Average Glucose 105 Not Established mg/dL   Type And Screen    Collection Time: 03/25/24  1:43 PM   Result Value Ref Range    ABO TYPE A     Rh TYPE POS     ANTIBODY SCREEN NEG    Coagulation Screen    Collection Time: 03/25/24  1:43 PM   Result Value Ref Range    Protime 12.2 9.8 - 12.8 seconds    INR 1.1 0.9 - 1.1    aPTT 40 (H) 27 - 38 seconds   Staphylococcus aureus/MRSA colonization, Culture    Collection Time: 03/25/24  1:43 PM     Specimen: Nares/Axilla/Groin; Swab   Result Value Ref Range    Staph/MRSA Screen Culture No Staphylococcus aureus isolated    ECG 12 Lead    Collection Time: 03/26/24  1:43 PM   Result Value Ref Range    Ventricular Rate 78 BPM    Atrial Rate 78 BPM    AR Interval 184 ms    QRS Duration 128 ms    QT Interval 410 ms    QTC Calculation(Bazett) 467 ms    P Axis 18 degrees    R Axis 1 degrees    T Axis 34 degrees    QRS Count 13 beats    Q Onset 219 ms    P Onset 127 ms    P Offset 178 ms    T Offset 424 ms    QTC Fredericia 447 ms

## 2024-03-26 LAB
ATRIAL RATE: 78 BPM
P AXIS: 18 DEGREES
P OFFSET: 178 MS
P ONSET: 127 MS
PR INTERVAL: 184 MS
Q ONSET: 219 MS
QRS COUNT: 13 BEATS
QRS DURATION: 128 MS
QT INTERVAL: 410 MS
QTC CALCULATION(BAZETT): 467 MS
QTC FREDERICIA: 447 MS
R AXIS: 1 DEGREES
T AXIS: 34 DEGREES
T OFFSET: 424 MS
VENTRICULAR RATE: 78 BPM

## 2024-03-26 PROCEDURE — 93005 ELECTROCARDIOGRAM TRACING: CPT

## 2024-03-27 LAB — STAPHYLOCOCCUS SPEC CULT: NORMAL

## 2024-03-28 RX ORDER — LOSARTAN POTASSIUM 50 MG/1
50 TABLET ORAL DAILY
COMMUNITY

## 2024-04-11 ENCOUNTER — ANESTHESIA EVENT (OUTPATIENT)
Dept: OPERATING ROOM | Facility: HOSPITAL | Age: 74
DRG: 454 | End: 2024-04-11
Payer: COMMERCIAL

## 2024-04-11 PROBLEM — M41.20 IDIOPATHIC SCOLIOSIS IN ADULT PATIENT: Status: ACTIVE | Noted: 2024-04-11

## 2024-04-11 RX ORDER — VIT C/E/ZN/COPPR/LUTEIN/ZEAXAN 250MG-90MG
25 CAPSULE ORAL DAILY
COMMUNITY

## 2024-04-11 RX ORDER — BRIMONIDINE TARTRATE AND TIMOLOL MALEATE 2; 5 MG/ML; MG/ML
SOLUTION OPHTHALMIC
COMMUNITY

## 2024-04-11 NOTE — HOSPITAL COURSE
HOSPITAL COURSE:   Ashley Mcnair is a 73 y.o. female with a past medical history of GERD, HLD, fibromyalgia, HTN and prior severe postop nausea who presented with progressive LBP, RLE radiculopathy and worsening coronal scoliosis. Patient taken to the OR on 4/12 for T10-pelvis instrumentation, L1-S1 PCOs, L4/5 and L5/S1 TLIF. Patient transfused with 1unit PRBCs due to drop in postoperative Hbg. 1 of 2 post operative drains were auto-discontinued on 4/14 and insertion site stitched closed. Remaining drain removed 4/16. Patient with significant post operative nausea/vomiting with onset of headache POD#2 which ultimately improved with IV Zofran/phenergan, hydration and caffeine. PT/OT evaluated patient and recommended low intensity level of continued care for which referral placed for home health care. On the day of discharge, the patient was seen and evaluated by the neurosurgery team and deemed suitable for discharge home.  There were no significant events overnight. Vitals were reviewed and within normal limits. Labs were stable at discharge. On day of discharge the patient was tolerating a diet, pain was controlled on PO pain medication, was ambulating well and voiding spontaneously. The patient was given detailed discharge instructions and were scheduled to follow up as an outpatient.

## 2024-04-11 NOTE — PROGRESS NOTES
"Pharmacy Medication History Review    Ashley Mcnair \"Alysa\" is a 73 y.o. female who is planned to be admitted for No Principal Problem: There is no principal problem currently on the Problem List. Please update the Problem List and refresh.. Pharmacy called the patient prior to their scheduled procedure and reviewed the patient's [id number] medications for accuracy.    Medications ADDED:  Combigan ophth drops  Cholecalciferol 1000U  Medications CHANGED:  Pregabalin 25mg to pregabalin 75mg  Medications REMOVED:   Alphagan ophth drops  Calcium citrate 200mg     Please review medication list and comments regarding how patient may be taking medications differently in the Admit Orders Activity.    Preferred pharmacy and allergies to be confirmed with patient by nursing the day of procedure.     Sources used to complete the med history include spoke with patient, surescripts, oarrs, care everywhere    Below are additional concerns with the patient's PTA list.  Patient stopped vitamins/supplements 7 days ago in preparation of procedure  Patient stopped meloxicam on 04/04/24 in preparation of procedure    Reyna Sam   [facility]  Please reach out via Secure Chat for questions   "

## 2024-04-12 ENCOUNTER — APPOINTMENT (OUTPATIENT)
Dept: RADIOLOGY | Facility: HOSPITAL | Age: 74
DRG: 454 | End: 2024-04-12
Payer: COMMERCIAL

## 2024-04-12 ENCOUNTER — HOSPITAL ENCOUNTER (INPATIENT)
Facility: HOSPITAL | Age: 74
LOS: 5 days | Discharge: HOME HEALTH | DRG: 454 | End: 2024-04-17
Attending: NEUROLOGICAL SURGERY | Admitting: NEUROLOGICAL SURGERY
Payer: COMMERCIAL

## 2024-04-12 ENCOUNTER — HOSPITAL ENCOUNTER (INPATIENT)
Dept: NEUROLOGY | Facility: HOSPITAL | Age: 74
Discharge: HOME | DRG: 454 | End: 2024-04-12
Payer: COMMERCIAL

## 2024-04-12 ENCOUNTER — ANESTHESIA (OUTPATIENT)
Dept: OPERATING ROOM | Facility: HOSPITAL | Age: 74
DRG: 454 | End: 2024-04-12
Payer: COMMERCIAL

## 2024-04-12 DIAGNOSIS — M54.9 OTHER CHRONIC BACK PAIN: ICD-10-CM

## 2024-04-12 DIAGNOSIS — M48.061 LUMBAR FORAMINAL STENOSIS: ICD-10-CM

## 2024-04-12 DIAGNOSIS — M41.20 IDIOPATHIC SCOLIOSIS IN ADULT PATIENT: Primary | ICD-10-CM

## 2024-04-12 DIAGNOSIS — M54.16 LUMBAR RADICULOPATHY, CHRONIC: ICD-10-CM

## 2024-04-12 DIAGNOSIS — G89.18 ACUTE POSTOPERATIVE PAIN: ICD-10-CM

## 2024-04-12 DIAGNOSIS — Z98.1 S/P SPINAL FUSION: ICD-10-CM

## 2024-04-12 DIAGNOSIS — G89.29 OTHER CHRONIC BACK PAIN: ICD-10-CM

## 2024-04-12 DIAGNOSIS — Z74.09 IMPAIRED FUNCTIONAL MOBILITY AND ENDURANCE: ICD-10-CM

## 2024-04-12 DIAGNOSIS — M41.55 SCOLIOSIS OF THORACOLUMBAR REGION DUE TO DEGENERATIVE DISEASE OF SPINE IN ADULT: ICD-10-CM

## 2024-04-12 LAB
ABO GROUP (TYPE) IN BLOOD: NORMAL
ALBUMIN SERPL BCP-MCNC: 3.4 G/DL (ref 3.4–5)
ANION GAP BLDA CALCULATED.4IONS-SCNC: 12 MMO/L (ref 10–25)
ANION GAP BLDA CALCULATED.4IONS-SCNC: 13 MMO/L (ref 10–25)
ANION GAP BLDA CALCULATED.4IONS-SCNC: 13 MMO/L (ref 10–25)
ANION GAP SERPL CALC-SCNC: 10 MMOL/L (ref 10–20)
ANION GAP SERPL CALC-SCNC: 12 MMOL/L (ref 10–20)
BASE EXCESS BLDA CALC-SCNC: -1.3 MMOL/L (ref -2–3)
BASE EXCESS BLDA CALC-SCNC: -1.7 MMOL/L (ref -2–3)
BASE EXCESS BLDA CALC-SCNC: -3.2 MMOL/L (ref -2–3)
BASOPHILS # BLD AUTO: 0.03 X10*3/UL (ref 0–0.1)
BASOPHILS NFR BLD AUTO: 0.3 %
BLOOD EXPIRATION DATE: NORMAL
BLOOD EXPIRATION DATE: NORMAL
BODY TEMPERATURE: 37 DEGREES CELSIUS
BUN SERPL-MCNC: 14 MG/DL (ref 6–23)
BUN SERPL-MCNC: 16 MG/DL (ref 6–23)
CA-I BLDA-SCNC: 1.01 MMOL/L (ref 1.1–1.33)
CA-I BLDA-SCNC: 1.05 MMOL/L (ref 1.1–1.33)
CA-I BLDA-SCNC: 1.12 MMOL/L (ref 1.1–1.33)
CALCIUM SERPL-MCNC: 8.5 MG/DL (ref 8.6–10.6)
CALCIUM SERPL-MCNC: 8.8 MG/DL (ref 8.6–10.6)
CHLORIDE BLDA-SCNC: 104 MMOL/L (ref 98–107)
CHLORIDE BLDA-SCNC: 106 MMOL/L (ref 98–107)
CHLORIDE BLDA-SCNC: 112 MMOL/L (ref 98–107)
CHLORIDE SERPL-SCNC: 110 MMOL/L (ref 98–107)
CHLORIDE SERPL-SCNC: 110 MMOL/L (ref 98–107)
CO2 SERPL-SCNC: 24 MMOL/L (ref 21–32)
CO2 SERPL-SCNC: 27 MMOL/L (ref 21–32)
CREAT SERPL-MCNC: 0.49 MG/DL (ref 0.5–1.05)
CREAT SERPL-MCNC: 0.5 MG/DL (ref 0.5–1.05)
DISPENSE STATUS: NORMAL
DISPENSE STATUS: NORMAL
EGFRCR SERPLBLD CKD-EPI 2021: >90 ML/MIN/1.73M*2
EGFRCR SERPLBLD CKD-EPI 2021: >90 ML/MIN/1.73M*2
EOSINOPHIL # BLD AUTO: 0 X10*3/UL (ref 0–0.4)
EOSINOPHIL NFR BLD AUTO: 0 %
ERYTHROCYTE [DISTWIDTH] IN BLOOD BY AUTOMATED COUNT: 13.7 % (ref 11.5–14.5)
ERYTHROCYTE [DISTWIDTH] IN BLOOD BY AUTOMATED COUNT: 14 % (ref 11.5–14.5)
GLUCOSE BLDA-MCNC: 107 MG/DL (ref 74–99)
GLUCOSE BLDA-MCNC: 111 MG/DL (ref 74–99)
GLUCOSE BLDA-MCNC: 130 MG/DL (ref 74–99)
GLUCOSE SERPL-MCNC: 158 MG/DL (ref 74–99)
GLUCOSE SERPL-MCNC: 183 MG/DL (ref 74–99)
HCO3 BLDA-SCNC: 22.1 MMOL/L (ref 22–26)
HCO3 BLDA-SCNC: 23 MMOL/L (ref 22–26)
HCO3 BLDA-SCNC: 23.7 MMOL/L (ref 22–26)
HCT VFR BLD AUTO: 33.4 % (ref 36–46)
HCT VFR BLD AUTO: 34.6 % (ref 36–46)
HCT VFR BLD EST: 20 % (ref 36–46)
HCT VFR BLD EST: 28 % (ref 36–46)
HCT VFR BLD EST: 32 % (ref 36–46)
HGB BLD-MCNC: 11.5 G/DL (ref 12–16)
HGB BLD-MCNC: 11.9 G/DL (ref 12–16)
HGB BLDA-MCNC: 10.7 G/DL (ref 12–16)
HGB BLDA-MCNC: 6.5 G/DL (ref 12–16)
HGB BLDA-MCNC: 9.3 G/DL (ref 12–16)
IMM GRANULOCYTES # BLD AUTO: 0.22 X10*3/UL (ref 0–0.5)
IMM GRANULOCYTES NFR BLD AUTO: 2.3 % (ref 0–0.9)
INHALED O2 CONCENTRATION: 40 %
LACTATE BLDA-SCNC: 2.3 MMOL/L (ref 0.4–2)
LACTATE BLDA-SCNC: 3.5 MMOL/L (ref 0.4–2)
LACTATE BLDA-SCNC: 4.1 MMOL/L (ref 0.4–2)
LYMPHOCYTES # BLD AUTO: 0.62 X10*3/UL (ref 0.8–3)
LYMPHOCYTES NFR BLD AUTO: 6.5 %
MCH RBC QN AUTO: 30.7 PG (ref 26–34)
MCH RBC QN AUTO: 31 PG (ref 26–34)
MCHC RBC AUTO-ENTMCNC: 34.4 G/DL (ref 32–36)
MCHC RBC AUTO-ENTMCNC: 34.4 G/DL (ref 32–36)
MCV RBC AUTO: 89 FL (ref 80–100)
MCV RBC AUTO: 90 FL (ref 80–100)
MONOCYTES # BLD AUTO: 0.2 X10*3/UL (ref 0.05–0.8)
MONOCYTES NFR BLD AUTO: 2.1 %
NEUTROPHILS # BLD AUTO: 8.52 X10*3/UL (ref 1.6–5.5)
NEUTROPHILS NFR BLD AUTO: 88.8 %
NRBC BLD-RTO: 0 /100 WBCS (ref 0–0)
NRBC BLD-RTO: 0 /100 WBCS (ref 0–0)
OXYHGB MFR BLDA: 97.1 % (ref 94–98)
OXYHGB MFR BLDA: 97.4 % (ref 94–98)
OXYHGB MFR BLDA: 97.7 % (ref 94–98)
PCO2 BLDA: 38 MM HG (ref 38–42)
PCO2 BLDA: 40 MM HG (ref 38–42)
PCO2 BLDA: 40 MM HG (ref 38–42)
PH BLDA: 7.35 PH (ref 7.38–7.42)
PH BLDA: 7.38 PH (ref 7.38–7.42)
PH BLDA: 7.39 PH (ref 7.38–7.42)
PHOSPHATE SERPL-MCNC: 3.6 MG/DL (ref 2.5–4.9)
PLATELET # BLD AUTO: 179 X10*3/UL (ref 150–450)
PLATELET # BLD AUTO: 202 X10*3/UL (ref 150–450)
PO2 BLDA: 162 MM HG (ref 85–95)
PO2 BLDA: 174 MM HG (ref 85–95)
PO2 BLDA: 180 MM HG (ref 85–95)
POTASSIUM BLDA-SCNC: 3.7 MMOL/L (ref 3.5–5.3)
POTASSIUM BLDA-SCNC: 3.7 MMOL/L (ref 3.5–5.3)
POTASSIUM BLDA-SCNC: 3.8 MMOL/L (ref 3.5–5.3)
POTASSIUM SERPL-SCNC: 4.2 MMOL/L (ref 3.5–5.3)
POTASSIUM SERPL-SCNC: 4.3 MMOL/L (ref 3.5–5.3)
PRODUCT BLOOD TYPE: 6200
PRODUCT BLOOD TYPE: 6200
PRODUCT CODE: NORMAL
PRODUCT CODE: NORMAL
RBC # BLD AUTO: 3.75 X10*6/UL (ref 4–5.2)
RBC # BLD AUTO: 3.84 X10*6/UL (ref 4–5.2)
RH FACTOR (ANTIGEN D): NORMAL
SAO2 % BLDA: 100 % (ref 94–100)
SODIUM BLDA-SCNC: 137 MMOL/L (ref 136–145)
SODIUM BLDA-SCNC: 137 MMOL/L (ref 136–145)
SODIUM BLDA-SCNC: 143 MMOL/L (ref 136–145)
SODIUM SERPL-SCNC: 142 MMOL/L (ref 136–145)
SODIUM SERPL-SCNC: 143 MMOL/L (ref 136–145)
UNIT ABO: NORMAL
UNIT ABO: NORMAL
UNIT NUMBER: NORMAL
UNIT NUMBER: NORMAL
UNIT RH: NORMAL
UNIT RH: NORMAL
UNIT VOLUME: 350
UNIT VOLUME: 350
WBC # BLD AUTO: 16.7 X10*3/UL (ref 4.4–11.3)
WBC # BLD AUTO: 9.6 X10*3/UL (ref 4.4–11.3)
XM INTEP: NORMAL
XM INTEP: NORMAL

## 2024-04-12 PROCEDURE — 2500000004 HC RX 250 GENERAL PHARMACY W/ HCPCS (ALT 636 FOR OP/ED): Performed by: STUDENT IN AN ORGANIZED HEALTH CARE EDUCATION/TRAINING PROGRAM

## 2024-04-12 PROCEDURE — 22633 ARTHRD CMBN 1NTRSPC LUMBAR: CPT | Performed by: NEUROLOGICAL SURGERY

## 2024-04-12 PROCEDURE — 01NB0ZZ RELEASE LUMBAR NERVE, OPEN APPROACH: ICD-10-PCS | Performed by: NEUROLOGICAL SURGERY

## 2024-04-12 PROCEDURE — 0QS004Z REPOSITION LUMBAR VERTEBRA WITH INTERNAL FIXATION DEVICE, OPEN APPROACH: ICD-10-PCS | Performed by: NEUROLOGICAL SURGERY

## 2024-04-12 PROCEDURE — 37799 UNLISTED PX VASCULAR SURGERY: CPT | Performed by: STUDENT IN AN ORGANIZED HEALTH CARE EDUCATION/TRAINING PROGRAM

## 2024-04-12 PROCEDURE — 22634 ARTHRD CMBN 1NTRSPC EA ADDL: CPT | Performed by: NEUROLOGICAL SURGERY

## 2024-04-12 PROCEDURE — 3600000018 HC OR TIME - INITIAL BASE CHARGE - PROCEDURE LEVEL SIX: Performed by: NEUROLOGICAL SURGERY

## 2024-04-12 PROCEDURE — 0RG70AJ FUSION OF 2 TO 7 THORACIC VERTEBRAL JOINTS WITH INTERBODY FUSION DEVICE, POSTERIOR APPROACH, ANTERIOR COLUMN, OPEN APPROACH: ICD-10-PCS | Performed by: NEUROLOGICAL SURGERY

## 2024-04-12 PROCEDURE — 0RGA0AJ FUSION OF THORACOLUMBAR VERTEBRAL JOINT WITH INTERBODY FUSION DEVICE, POSTERIOR APPROACH, ANTERIOR COLUMN, OPEN APPROACH: ICD-10-PCS | Performed by: NEUROLOGICAL SURGERY

## 2024-04-12 PROCEDURE — 3600000017 HC OR TIME - EACH INCREMENTAL 1 MINUTE - PROCEDURE LEVEL SIX: Performed by: NEUROLOGICAL SURGERY

## 2024-04-12 PROCEDURE — 3700000002 HC GENERAL ANESTHESIA TIME - EACH INCREMENTAL 1 MINUTE: Performed by: NEUROLOGICAL SURGERY

## 2024-04-12 PROCEDURE — 22843 INSERT SPINE FIXATION DEVICE: CPT | Performed by: NEUROLOGICAL SURGERY

## 2024-04-12 PROCEDURE — 84132 ASSAY OF SERUM POTASSIUM: CPT | Performed by: STUDENT IN AN ORGANIZED HEALTH CARE EDUCATION/TRAINING PROGRAM

## 2024-04-12 PROCEDURE — 00QT0ZZ REPAIR SPINAL MENINGES, OPEN APPROACH: ICD-10-PCS | Performed by: NEUROLOGICAL SURGERY

## 2024-04-12 PROCEDURE — C1821 INTERSPINOUS IMPLANT: HCPCS | Performed by: NEUROLOGICAL SURGERY

## 2024-04-12 PROCEDURE — 2500000005 HC RX 250 GENERAL PHARMACY W/O HCPCS: Performed by: STUDENT IN AN ORGANIZED HEALTH CARE EDUCATION/TRAINING PROGRAM

## 2024-04-12 PROCEDURE — 63047 LAM FACETEC & FORAMOT LUMBAR: CPT | Performed by: NEUROLOGICAL SURGERY

## 2024-04-12 PROCEDURE — 86920 COMPATIBILITY TEST SPIN: CPT

## 2024-04-12 PROCEDURE — 22614 ARTHRD PST TQ 1NTRSPC EA ADD: CPT | Performed by: NEUROLOGICAL SURGERY

## 2024-04-12 PROCEDURE — 7100000001 HC RECOVERY ROOM TIME - INITIAL BASE CHARGE: Performed by: NEUROLOGICAL SURGERY

## 2024-04-12 PROCEDURE — 0ST20ZZ RESECTION OF LUMBAR VERTEBRAL DISC, OPEN APPROACH: ICD-10-PCS | Performed by: NEUROLOGICAL SURGERY

## 2024-04-12 PROCEDURE — 99231 SBSQ HOSP IP/OBS SF/LOW 25: CPT | Performed by: STUDENT IN AN ORGANIZED HEALTH CARE EDUCATION/TRAINING PROGRAM

## 2024-04-12 PROCEDURE — P9045 ALBUMIN (HUMAN), 5%, 250 ML: HCPCS | Mod: JZ

## 2024-04-12 PROCEDURE — 01NR0ZZ RELEASE SACRAL NERVE, OPEN APPROACH: ICD-10-PCS | Performed by: NEUROLOGICAL SURGERY

## 2024-04-12 PROCEDURE — 2020000001 HC ICU ROOM DAILY

## 2024-04-12 PROCEDURE — 3700000001 HC GENERAL ANESTHESIA TIME - INITIAL BASE CHARGE: Performed by: NEUROLOGICAL SURGERY

## 2024-04-12 PROCEDURE — P9016 RBC LEUKOCYTES REDUCED: HCPCS

## 2024-04-12 PROCEDURE — A4649 SURGICAL SUPPLIES: HCPCS | Performed by: NEUROLOGICAL SURGERY

## 2024-04-12 PROCEDURE — 95938 SOMATOSENSORY TESTING: CPT | Performed by: STUDENT IN AN ORGANIZED HEALTH CARE EDUCATION/TRAINING PROGRAM

## 2024-04-12 PROCEDURE — 37799 UNLISTED PX VASCULAR SURGERY: CPT

## 2024-04-12 PROCEDURE — C1762 CONN TISS, HUMAN(INC FASCIA): HCPCS | Performed by: NEUROLOGICAL SURGERY

## 2024-04-12 PROCEDURE — 22216 INCIS ADDL SPINE SEGMENT: CPT | Performed by: NEUROLOGICAL SURGERY

## 2024-04-12 PROCEDURE — 0SG30AJ FUSION OF LUMBOSACRAL JOINT WITH INTERBODY FUSION DEVICE, POSTERIOR APPROACH, ANTERIOR COLUMN, OPEN APPROACH: ICD-10-PCS | Performed by: NEUROLOGICAL SURGERY

## 2024-04-12 PROCEDURE — 72100 X-RAY EXAM L-S SPINE 2/3 VWS: CPT

## 2024-04-12 PROCEDURE — 63052 LAM FACETC/FRMT ARTHRD LUM 1: CPT | Performed by: NEUROLOGICAL SURGERY

## 2024-04-12 PROCEDURE — 20936 SP BONE AGRFT LOCAL ADD-ON: CPT | Performed by: NEUROLOGICAL SURGERY

## 2024-04-12 PROCEDURE — 2500000004 HC RX 250 GENERAL PHARMACY W/ HCPCS (ALT 636 FOR OP/ED)

## 2024-04-12 PROCEDURE — 76942 ECHO GUIDE FOR BIOPSY: CPT | Performed by: STUDENT IN AN ORGANIZED HEALTH CARE EDUCATION/TRAINING PROGRAM

## 2024-04-12 PROCEDURE — C1713 ANCHOR/SCREW BN/BN,TIS/BN: HCPCS | Performed by: NEUROLOGICAL SURGERY

## 2024-04-12 PROCEDURE — 36430 TRANSFUSION BLD/BLD COMPNT: CPT | Mod: GC

## 2024-04-12 PROCEDURE — 0SG804Z FUSION OF LEFT SACROILIAC JOINT WITH INTERNAL FIXATION DEVICE, OPEN APPROACH: ICD-10-PCS | Performed by: NEUROLOGICAL SURGERY

## 2024-04-12 PROCEDURE — 22848 INSERT PELV FIXATION DEVICE: CPT | Performed by: NEUROLOGICAL SURGERY

## 2024-04-12 PROCEDURE — G0453 CONT INTRAOP NEURO MONITOR: HCPCS | Performed by: STUDENT IN AN ORGANIZED HEALTH CARE EDUCATION/TRAINING PROGRAM

## 2024-04-12 PROCEDURE — A22633 PR ARTHDSIS POST/POSTEROLATRL/POSTINTERBODY LUMBAR: Performed by: ANESTHESIOLOGY

## 2024-04-12 PROCEDURE — 95940 IONM IN OPERATNG ROOM 15 MIN: CPT

## 2024-04-12 PROCEDURE — 2500000005 HC RX 250 GENERAL PHARMACY W/O HCPCS: Performed by: NEUROLOGICAL SURGERY

## 2024-04-12 PROCEDURE — 8E0WXBZ COMPUTER ASSISTED PROCEDURE OF TRUNK REGION: ICD-10-PCS | Performed by: NEUROLOGICAL SURGERY

## 2024-04-12 PROCEDURE — 2780000003 HC OR 278 NO HCPCS: Performed by: NEUROLOGICAL SURGERY

## 2024-04-12 PROCEDURE — 85027 COMPLETE CBC AUTOMATED: CPT | Performed by: STUDENT IN AN ORGANIZED HEALTH CARE EDUCATION/TRAINING PROGRAM

## 2024-04-12 PROCEDURE — 22214 INCIS 1 VERTEBRAL SEG LUMBAR: CPT | Performed by: NEUROLOGICAL SURGERY

## 2024-04-12 PROCEDURE — 2720000007 HC OR 272 NO HCPCS: Performed by: NEUROLOGICAL SURGERY

## 2024-04-12 PROCEDURE — A4217 STERILE WATER/SALINE, 500 ML: HCPCS | Performed by: NEUROLOGICAL SURGERY

## 2024-04-12 PROCEDURE — 20930 SP BONE ALGRFT MORSEL ADD-ON: CPT | Performed by: NEUROLOGICAL SURGERY

## 2024-04-12 PROCEDURE — 99100 ANES PT EXTEME AGE<1 YR&>70: CPT | Performed by: ANESTHESIOLOGY

## 2024-04-12 PROCEDURE — 61783 SCAN PROC SPINAL: CPT | Performed by: NEUROLOGICAL SURGERY

## 2024-04-12 PROCEDURE — 2500000004 HC RX 250 GENERAL PHARMACY W/ HCPCS (ALT 636 FOR OP/ED): Performed by: NEUROLOGICAL SURGERY

## 2024-04-12 PROCEDURE — 0SG00AJ FUSION OF LUMBAR VERTEBRAL JOINT WITH INTERBODY FUSION DEVICE, POSTERIOR APPROACH, ANTERIOR COLUMN, OPEN APPROACH: ICD-10-PCS | Performed by: NEUROLOGICAL SURGERY

## 2024-04-12 PROCEDURE — C1776 JOINT DEVICE (IMPLANTABLE): HCPCS | Performed by: NEUROLOGICAL SURGERY

## 2024-04-12 PROCEDURE — 22853 INSJ BIOMECHANICAL DEVICE: CPT | Performed by: NEUROLOGICAL SURGERY

## 2024-04-12 PROCEDURE — 36620 INSERTION CATHETER ARTERY: CPT

## 2024-04-12 PROCEDURE — 2500000001 HC RX 250 WO HCPCS SELF ADMINISTERED DRUGS (ALT 637 FOR MEDICARE OP): Performed by: STUDENT IN AN ORGANIZED HEALTH CARE EDUCATION/TRAINING PROGRAM

## 2024-04-12 PROCEDURE — 84132 ASSAY OF SERUM POTASSIUM: CPT

## 2024-04-12 PROCEDURE — 95955 EEG DURING SURGERY: CPT | Performed by: STUDENT IN AN ORGANIZED HEALTH CARE EDUCATION/TRAINING PROGRAM

## 2024-04-12 PROCEDURE — 95860 NEEDLE EMG 1 EXTREMITY: CPT | Performed by: STUDENT IN AN ORGANIZED HEALTH CARE EDUCATION/TRAINING PROGRAM

## 2024-04-12 PROCEDURE — 85025 COMPLETE CBC W/AUTO DIFF WBC: CPT | Performed by: STUDENT IN AN ORGANIZED HEALTH CARE EDUCATION/TRAINING PROGRAM

## 2024-04-12 PROCEDURE — 2500000005 HC RX 250 GENERAL PHARMACY W/O HCPCS

## 2024-04-12 PROCEDURE — 0SG10AJ FUSION OF 2 OR MORE LUMBAR VERTEBRAL JOINTS WITH INTERBODY FUSION DEVICE, POSTERIOR APPROACH, ANTERIOR COLUMN, OPEN APPROACH: ICD-10-PCS | Performed by: NEUROLOGICAL SURGERY

## 2024-04-12 PROCEDURE — 7100000002 HC RECOVERY ROOM TIME - EACH INCREMENTAL 1 MINUTE: Performed by: NEUROLOGICAL SURGERY

## 2024-04-12 PROCEDURE — 0SG704Z FUSION OF RIGHT SACROILIAC JOINT WITH INTERNAL FIXATION DEVICE, OPEN APPROACH: ICD-10-PCS | Performed by: NEUROLOGICAL SURGERY

## 2024-04-12 DEVICE — BONE GRAFT KIT 7510800 INFUSE LARGE II
Type: IMPLANTABLE DEVICE | Site: SPINE LUMBAR | Status: FUNCTIONAL
Brand: INFUSE® BONE GRAFT

## 2024-04-12 DEVICE — SCREW, RELINE-O, 8.5X80MM 2S POLY ILIAC: Type: IMPLANTABLE DEVICE | Site: SPINE LUMBAR | Status: FUNCTIONAL

## 2024-04-12 DEVICE — IMPLANTABLE DEVICE: Type: IMPLANTABLE DEVICE | Site: SPINE LUMBAR | Status: FUNCTIONAL

## 2024-04-12 DEVICE — SCREW, RELINE-O, 7.5X50MM 2S POLYAXIAL: Type: IMPLANTABLE DEVICE | Site: SPINE LUMBAR | Status: FUNCTIONAL

## 2024-04-12 DEVICE — SCREW, RELINE-O, 7.5X45MM 2S POLYAXIAL: Type: IMPLANTABLE DEVICE | Site: SPINE LUMBAR | Status: FUNCTIONAL

## 2024-04-12 DEVICE — SCREW, RELINE-O, 6.5X45MM 2S POLYAXIAL: Type: IMPLANTABLE DEVICE | Site: SPINE LUMBAR | Status: FUNCTIONAL

## 2024-04-12 DEVICE — SCREW, RELINE LOCK, 5.5MM OPEN TULIP: Type: IMPLANTABLE DEVICE | Site: SPINE LUMBAR | Status: FUNCTIONAL

## 2024-04-12 RX ORDER — PHENYLEPHRINE 10 MG/250 ML(40 MCG/ML)IN 0.9 % SOD.CHLORIDE INTRAVENOUS
CONTINUOUS PRN
Status: DISCONTINUED | OUTPATIENT
Start: 2024-04-12 | End: 2024-04-12

## 2024-04-12 RX ORDER — ATORVASTATIN CALCIUM 10 MG/1
10 TABLET, FILM COATED ORAL DAILY
Status: DISCONTINUED | OUTPATIENT
Start: 2024-04-12 | End: 2024-04-17 | Stop reason: HOSPADM

## 2024-04-12 RX ORDER — MIDAZOLAM HYDROCHLORIDE 1 MG/ML
INJECTION INTRAMUSCULAR; INTRAVENOUS AS NEEDED
Status: DISCONTINUED | OUTPATIENT
Start: 2024-04-12 | End: 2024-04-12

## 2024-04-12 RX ORDER — PHENYLEPHRINE HCL IN 0.9% NACL 0.4MG/10ML
SYRINGE (ML) INTRAVENOUS AS NEEDED
Status: DISCONTINUED | OUTPATIENT
Start: 2024-04-12 | End: 2024-04-12

## 2024-04-12 RX ORDER — PROPOFOL 10 MG/ML
INJECTION, EMULSION INTRAVENOUS AS NEEDED
Status: DISCONTINUED | OUTPATIENT
Start: 2024-04-12 | End: 2024-04-12

## 2024-04-12 RX ORDER — BISACODYL 5 MG
10 TABLET, DELAYED RELEASE (ENTERIC COATED) ORAL DAILY PRN
Status: DISCONTINUED | OUTPATIENT
Start: 2024-04-12 | End: 2024-04-17 | Stop reason: HOSPADM

## 2024-04-12 RX ORDER — CYCLOBENZAPRINE HCL 10 MG
5 TABLET ORAL 3 TIMES DAILY
Status: DISCONTINUED | OUTPATIENT
Start: 2024-04-12 | End: 2024-04-17 | Stop reason: HOSPADM

## 2024-04-12 RX ORDER — ACETAMINOPHEN 325 MG/1
650 TABLET ORAL EVERY 6 HOURS
Status: DISCONTINUED | OUTPATIENT
Start: 2024-04-12 | End: 2024-04-14

## 2024-04-12 RX ORDER — LIDOCAINE HYDROCHLORIDE AND EPINEPHRINE 10; 10 MG/ML; UG/ML
INJECTION, SOLUTION INFILTRATION; PERINEURAL AS NEEDED
Status: DISCONTINUED | OUTPATIENT
Start: 2024-04-12 | End: 2024-04-12 | Stop reason: HOSPADM

## 2024-04-12 RX ORDER — ONDANSETRON 4 MG/1
4 TABLET, FILM COATED ORAL EVERY 8 HOURS PRN
Status: DISCONTINUED | OUTPATIENT
Start: 2024-04-12 | End: 2024-04-17 | Stop reason: HOSPADM

## 2024-04-12 RX ORDER — PANTOPRAZOLE SODIUM 40 MG/1
40 TABLET, DELAYED RELEASE ORAL
Status: DISCONTINUED | OUTPATIENT
Start: 2024-04-12 | End: 2024-04-17 | Stop reason: HOSPADM

## 2024-04-12 RX ORDER — ROCURONIUM BROMIDE 10 MG/ML
INJECTION, SOLUTION INTRAVENOUS AS NEEDED
Status: DISCONTINUED | OUTPATIENT
Start: 2024-04-12 | End: 2024-04-12

## 2024-04-12 RX ORDER — CEFAZOLIN 1 G/1
INJECTION, POWDER, FOR SOLUTION INTRAVENOUS AS NEEDED
Status: DISCONTINUED | OUTPATIENT
Start: 2024-04-12 | End: 2024-04-12

## 2024-04-12 RX ORDER — ONDANSETRON HYDROCHLORIDE 2 MG/ML
4 INJECTION, SOLUTION INTRAVENOUS EVERY 8 HOURS PRN
Status: DISCONTINUED | OUTPATIENT
Start: 2024-04-12 | End: 2024-04-17 | Stop reason: HOSPADM

## 2024-04-12 RX ORDER — ROPIVACAINE HYDROCHLORIDE 5 MG/ML
INJECTION, SOLUTION EPIDURAL; INFILTRATION; PERINEURAL AS NEEDED
Status: DISCONTINUED | OUTPATIENT
Start: 2024-04-12 | End: 2024-04-12

## 2024-04-12 RX ORDER — METHADONE HYDROCHLORIDE 10 MG/ML
10 INJECTION, SOLUTION INTRAMUSCULAR; INTRAVENOUS; SUBCUTANEOUS ONCE
Status: COMPLETED | OUTPATIENT
Start: 2024-04-12 | End: 2024-04-12

## 2024-04-12 RX ORDER — BUPIVACAINE HYDROCHLORIDE 2.5 MG/ML
INJECTION, SOLUTION EPIDURAL; INFILTRATION; INTRACAUDAL AS NEEDED
Status: DISCONTINUED | OUTPATIENT
Start: 2024-04-12 | End: 2024-04-12 | Stop reason: HOSPADM

## 2024-04-12 RX ORDER — KETOROLAC TROMETHAMINE 15 MG/ML
15 INJECTION, SOLUTION INTRAMUSCULAR; INTRAVENOUS EVERY 6 HOURS
Status: DISPENSED | OUTPATIENT
Start: 2024-04-12 | End: 2024-04-14

## 2024-04-12 RX ORDER — SODIUM CHLORIDE 0.9 G/100ML
IRRIGANT IRRIGATION AS NEEDED
Status: DISCONTINUED | OUTPATIENT
Start: 2024-04-12 | End: 2024-04-12 | Stop reason: HOSPADM

## 2024-04-12 RX ORDER — LIDOCAINE 560 MG/1
2 PATCH PERCUTANEOUS; TOPICAL; TRANSDERMAL DAILY
Status: DISCONTINUED | OUTPATIENT
Start: 2024-04-12 | End: 2024-04-17 | Stop reason: HOSPADM

## 2024-04-12 RX ORDER — REMIFENTANIL HYDROCHLORIDE 1 MG/ML
INJECTION, POWDER, LYOPHILIZED, FOR SOLUTION INTRAVENOUS CONTINUOUS PRN
Status: DISCONTINUED | OUTPATIENT
Start: 2024-04-12 | End: 2024-04-12

## 2024-04-12 RX ORDER — ALBUMIN HUMAN 50 G/1000ML
SOLUTION INTRAVENOUS AS NEEDED
Status: DISCONTINUED | OUTPATIENT
Start: 2024-04-12 | End: 2024-04-12

## 2024-04-12 RX ORDER — VANCOMYCIN HYDROCHLORIDE 1 G/20ML
INJECTION, POWDER, LYOPHILIZED, FOR SOLUTION INTRAVENOUS AS NEEDED
Status: DISCONTINUED | OUTPATIENT
Start: 2024-04-12 | End: 2024-04-12 | Stop reason: HOSPADM

## 2024-04-12 RX ORDER — CEFAZOLIN 1 G/1
INJECTION, POWDER, FOR SOLUTION INTRAVENOUS AS NEEDED
Status: DISCONTINUED | OUTPATIENT
Start: 2024-04-12 | End: 2024-04-12 | Stop reason: HOSPADM

## 2024-04-12 RX ORDER — GLYCOPYRROLATE 0.2 MG/ML
INJECTION INTRAMUSCULAR; INTRAVENOUS AS NEEDED
Status: DISCONTINUED | OUTPATIENT
Start: 2024-04-12 | End: 2024-04-12

## 2024-04-12 RX ORDER — LIDOCAINE HYDROCHLORIDE 20 MG/ML
INJECTION, SOLUTION INFILTRATION; PERINEURAL AS NEEDED
Status: DISCONTINUED | OUTPATIENT
Start: 2024-04-12 | End: 2024-04-12

## 2024-04-12 RX ORDER — SODIUM CHLORIDE, SODIUM LACTATE, POTASSIUM CHLORIDE, CALCIUM CHLORIDE 600; 310; 30; 20 MG/100ML; MG/100ML; MG/100ML; MG/100ML
100 INJECTION, SOLUTION INTRAVENOUS CONTINUOUS
Status: ACTIVE | OUTPATIENT
Start: 2024-04-12 | End: 2024-04-13

## 2024-04-12 RX ORDER — NALOXONE HYDROCHLORIDE 0.4 MG/ML
0.2 INJECTION, SOLUTION INTRAMUSCULAR; INTRAVENOUS; SUBCUTANEOUS AS NEEDED
Status: DISCONTINUED | OUTPATIENT
Start: 2024-04-12 | End: 2024-04-17 | Stop reason: HOSPADM

## 2024-04-12 RX ORDER — DEXTROSE 50 % IN WATER (D50W) INTRAVENOUS SYRINGE
12.5
Status: DISCONTINUED | OUTPATIENT
Start: 2024-04-12 | End: 2024-04-17 | Stop reason: HOSPADM

## 2024-04-12 RX ORDER — ONDANSETRON HYDROCHLORIDE 2 MG/ML
INJECTION, SOLUTION INTRAVENOUS AS NEEDED
Status: DISCONTINUED | OUTPATIENT
Start: 2024-04-12 | End: 2024-04-12

## 2024-04-12 RX ORDER — FENTANYL CITRATE 50 UG/ML
INJECTION, SOLUTION INTRAMUSCULAR; INTRAVENOUS AS NEEDED
Status: DISCONTINUED | OUTPATIENT
Start: 2024-04-12 | End: 2024-04-12

## 2024-04-12 RX ORDER — HYDROMORPHONE HCL/0.9% NACL/PF 15 MG/30ML
PATIENT CONTROLLED ANALGESIA SYRINGE INTRAVENOUS CONTINUOUS
Status: DISCONTINUED | OUTPATIENT
Start: 2024-04-12 | End: 2024-04-13 | Stop reason: DRUGHIGH

## 2024-04-12 RX ORDER — POLYETHYLENE GLYCOL 3350 17 G/17G
17 POWDER, FOR SOLUTION ORAL 2 TIMES DAILY
Status: DISCONTINUED | OUTPATIENT
Start: 2024-04-12 | End: 2024-04-17 | Stop reason: HOSPADM

## 2024-04-12 RX ORDER — DEXTROSE 50 % IN WATER (D50W) INTRAVENOUS SYRINGE
25
Status: DISCONTINUED | OUTPATIENT
Start: 2024-04-12 | End: 2024-04-17 | Stop reason: HOSPADM

## 2024-04-12 RX ORDER — POLYMYXIN B 500000 [USP'U]/1
INJECTION, POWDER, LYOPHILIZED, FOR SOLUTION INTRAMUSCULAR; INTRATHECAL; INTRAVENOUS; OPHTHALMIC AS NEEDED
Status: DISCONTINUED | OUTPATIENT
Start: 2024-04-12 | End: 2024-04-12 | Stop reason: HOSPADM

## 2024-04-12 RX ORDER — SODIUM CHLORIDE, SODIUM LACTATE, POTASSIUM CHLORIDE, CALCIUM CHLORIDE 600; 310; 30; 20 MG/100ML; MG/100ML; MG/100ML; MG/100ML
INJECTION, SOLUTION INTRAVENOUS CONTINUOUS PRN
Status: DISCONTINUED | OUTPATIENT
Start: 2024-04-12 | End: 2024-04-12

## 2024-04-12 RX ORDER — ONDANSETRON 2 MG/ML
INJECTION INTRAMUSCULAR; INTRAVENOUS AS NEEDED
Status: DISCONTINUED | OUTPATIENT
Start: 2024-04-12 | End: 2024-04-12

## 2024-04-12 RX ADMIN — Medication 0.5 MG/KG/HR: at 08:36

## 2024-04-12 RX ADMIN — ROCURONIUM 50 MG: 50 INJECTION, SOLUTION INTRAVENOUS at 07:50

## 2024-04-12 RX ADMIN — ACETAMINOPHEN 650 MG: 325 TABLET ORAL at 22:09

## 2024-04-12 RX ADMIN — CEFAZOLIN 2 G: 1 INJECTION, POWDER, FOR SOLUTION INTRAMUSCULAR; INTRAVENOUS at 09:19

## 2024-04-12 RX ADMIN — Medication 0.2 MCG/KG/MIN: at 09:50

## 2024-04-12 RX ADMIN — ALBUMIN (HUMAN) 250 ML: 12.5 SOLUTION INTRAVENOUS at 09:40

## 2024-04-12 RX ADMIN — Medication 40 MCG: at 09:05

## 2024-04-12 RX ADMIN — Medication 120 MCG: at 12:50

## 2024-04-12 RX ADMIN — DEXAMETHASONE SODIUM PHOSPHATE 4 MG: 4 INJECTION INTRA-ARTICULAR; INTRALESIONAL; INTRAMUSCULAR; INTRAVENOUS; SOFT TISSUE at 07:47

## 2024-04-12 RX ADMIN — SODIUM CHLORIDE, POTASSIUM CHLORIDE, SODIUM LACTATE AND CALCIUM CHLORIDE: 600; 310; 30; 20 INJECTION, SOLUTION INTRAVENOUS at 07:47

## 2024-04-12 RX ADMIN — ALBUMIN (HUMAN) 250 ML: 12.5 SOLUTION INTRAVENOUS at 09:56

## 2024-04-12 RX ADMIN — POLYETHYLENE GLYCOL 3350 17 G: 17 POWDER, FOR SOLUTION ORAL at 22:09

## 2024-04-12 RX ADMIN — Medication 120 MCG: at 12:52

## 2024-04-12 RX ADMIN — SUGAMMADEX 200 MG: 100 INJECTION, SOLUTION INTRAVENOUS at 14:45

## 2024-04-12 RX ADMIN — ONDANSETRON 4 MG: 2 INJECTION INTRAMUSCULAR; INTRAVENOUS at 17:38

## 2024-04-12 RX ADMIN — PROPOFOL 120 MG: 10 INJECTION, EMULSION INTRAVENOUS at 07:48

## 2024-04-12 RX ADMIN — CEFAZOLIN 2 G: 1 INJECTION, POWDER, FOR SOLUTION INTRAMUSCULAR; INTRAVENOUS at 12:58

## 2024-04-12 RX ADMIN — LIDOCAINE 2 PATCH: 4 PATCH TOPICAL at 17:31

## 2024-04-12 RX ADMIN — PROPOFOL 100 MCG/KG/MIN: 10 INJECTION, EMULSION INTRAVENOUS at 08:10

## 2024-04-12 RX ADMIN — Medication 80 MCG: at 10:50

## 2024-04-12 RX ADMIN — MIDAZOLAM HYDROCHLORIDE 2 MG: 1 INJECTION, SOLUTION INTRAMUSCULAR; INTRAVENOUS at 07:15

## 2024-04-12 RX ADMIN — Medication 40 MCG: at 09:43

## 2024-04-12 RX ADMIN — Medication 40 MCG: at 08:19

## 2024-04-12 RX ADMIN — Medication 80 MCG: at 11:08

## 2024-04-12 RX ADMIN — CYCLOBENZAPRINE 5 MG: 10 TABLET, FILM COATED ORAL at 22:09

## 2024-04-12 RX ADMIN — GLYCOPYRROLATE 0.2 MG: 0.2 INJECTION INTRAMUSCULAR; INTRAVENOUS at 07:12

## 2024-04-12 RX ADMIN — ROCURONIUM 30 MG: 50 INJECTION, SOLUTION INTRAVENOUS at 13:16

## 2024-04-12 RX ADMIN — Medication 120 MCG: at 13:50

## 2024-04-12 RX ADMIN — SODIUM CHLORIDE 3 MG/KG/HR: 9 INJECTION, SOLUTION INTRAVENOUS at 07:47

## 2024-04-12 RX ADMIN — CALCIUM CHLORIDE 1 G: 100 INJECTION, SOLUTION INTRAVENOUS at 14:27

## 2024-04-12 RX ADMIN — FENTANYL CITRATE 50 MCG: 50 INJECTION, SOLUTION INTRAMUSCULAR; INTRAVENOUS at 07:50

## 2024-04-12 RX ADMIN — LIDOCAINE HYDROCHLORIDE 50 MG: 20 INJECTION, SOLUTION INFILTRATION; PERINEURAL at 07:50

## 2024-04-12 RX ADMIN — REMIFENTANIL HYDROCHLORIDE 0.05 MCG/KG/MIN: 1 INJECTION, POWDER, LYOPHILIZED, FOR SOLUTION INTRAVENOUS at 10:30

## 2024-04-12 RX ADMIN — SODIUM CHLORIDE, POTASSIUM CHLORIDE, SODIUM LACTATE AND CALCIUM CHLORIDE 100 ML/HR: 600; 310; 30; 20 INJECTION, SOLUTION INTRAVENOUS at 16:54

## 2024-04-12 RX ADMIN — ONDANSETRON 4 MG: 2 INJECTION, SOLUTION INTRAMUSCULAR; INTRAVENOUS at 13:52

## 2024-04-12 RX ADMIN — METHADONE HYDROCHLORIDE 10 MG: 10 INJECTION, SOLUTION INTRAMUSCULAR; INTRAVENOUS; SUBCUTANEOUS at 08:44

## 2024-04-12 RX ADMIN — DEXAMETHASONE SODIUM PHOSPHATE 4 MG: 4 INJECTION INTRA-ARTICULAR; INTRALESIONAL; INTRAMUSCULAR; INTRAVENOUS; SOFT TISSUE at 09:25

## 2024-04-12 RX ADMIN — Medication: at 17:55

## 2024-04-12 RX ADMIN — ROPIVACAINE HYDROCHLORIDE 15 ML: 5 INJECTION, SOLUTION EPIDURAL; INFILTRATION; PERINEURAL at 07:20

## 2024-04-12 RX ADMIN — PROPOFOL 30 MG: 10 INJECTION, EMULSION INTRAVENOUS at 08:22

## 2024-04-12 RX ADMIN — FENTANYL CITRATE 50 MCG: 50 INJECTION, SOLUTION INTRAMUSCULAR; INTRAVENOUS at 08:14

## 2024-04-12 RX ADMIN — ROCURONIUM 20 MG: 50 INJECTION, SOLUTION INTRAVENOUS at 08:42

## 2024-04-12 RX ADMIN — ROPIVACAINE HYDROCHLORIDE 15 ML: 5 INJECTION, SOLUTION EPIDURAL; INFILTRATION; PERINEURAL at 07:27

## 2024-04-12 RX ADMIN — GLYCOPYRROLATE 0.2 MG: 0.2 INJECTION INTRAMUSCULAR; INTRAVENOUS at 11:08

## 2024-04-12 RX ADMIN — KETOROLAC TROMETHAMINE 15 MG: 15 INJECTION, SOLUTION INTRAMUSCULAR; INTRAVENOUS at 18:10

## 2024-04-12 RX ADMIN — PROPOFOL 75 MCG: 10 INJECTION, EMULSION INTRAVENOUS at 09:02

## 2024-04-12 RX ADMIN — ROCURONIUM 20 MG: 50 INJECTION, SOLUTION INTRAVENOUS at 09:26

## 2024-04-12 RX ADMIN — ALBUMIN (HUMAN) 250 ML: 12.5 SOLUTION INTRAVENOUS at 11:40

## 2024-04-12 ASSESSMENT — PAIN SCALES - GENERAL
PAINLEVEL_OUTOF10: 0 - NO PAIN
PAINLEVEL_OUTOF10: 5 - MODERATE PAIN
PAINLEVEL_OUTOF10: 0 - NO PAIN
PAINLEVEL_OUTOF10: 0 - NO PAIN
PAIN_LEVEL: 0
PAINLEVEL_OUTOF10: 0 - NO PAIN

## 2024-04-12 ASSESSMENT — PAIN - FUNCTIONAL ASSESSMENT
PAIN_FUNCTIONAL_ASSESSMENT: 0-10

## 2024-04-12 NOTE — ANESTHESIA POSTPROCEDURE EVALUATION
"Patient: Ashley Mcnair \"Alysa\"    Procedure Summary       Date: 04/12/24 Room / Location: [facility] OR 26 / [facility] [place]    Anesthesia Start: 0740 Anesthesia Stop: 1515    Procedures:       OPEN SURGERY - L3-4 laminectomy and facetectomy for decompression , L4-5 posterior column osteotomy , L5-S1 laminectomy and facetectomy for decompression with L4-5 and L5-S1 posterior lumbar interbody fusion using local bone autograft and BMP with titanium interbody cage placement and L2-S1 instrumentation and fusion with pelvic fixation. (Spine Lumbar)      Decompression at L5-S1 and (Spine Lumbar) Diagnosis:       Lumbar foraminal stenosis      Lumbar radiculopathy, chronic      Scoliosis of thoracolumbar region due to degenerative disease of spine in adult      Other chronic back pain      (Lumbar foraminal stenosis [M48.061])      (Lumbar radiculopathy, chronic [M54.16])      (Scoliosis of thoracolumbar region due to degenerative disease of spine in adult [M41.55])      (Other chronic back pain [M54.9, G89.29])    Surgeons: Pop Whelan MD Responsible Provider: Franko Leon MD PhD    Anesthesia Type: general ASA Status: 3            Anesthesia Type: general    Vitals Value Taken Time   /64 04/12/24 1510   Temp 35.3 04/12/24 1515   Pulse 64 04/12/24 1511   Resp 7 04/12/24 1511   SpO2 100 % 04/12/24 1511   Vitals shown include unfiled device data.    Anesthesia Post Evaluation    Patient location during evaluation: PACU  Patient participation: complete - patient cannot participate  Level of consciousness: sleepy but conscious  Pain score: 0  Pain management: adequate  Airway patency: patent  Cardiovascular status: acceptable  Respiratory status: acceptable  Hydration status: acceptable  Postoperative Nausea and Vomiting: none        There were no known notable events for this encounter.    "

## 2024-04-12 NOTE — SIGNIFICANT EVENT
Report called to NSU RN. Patients VSS for transfer to the NSU. Patient transferred on monitor with 2 PACU Rns.

## 2024-04-12 NOTE — ANESTHESIA PREPROCEDURE EVALUATION
"Patient: Ashley Mcnair \"Alysa\"    Procedure Information       Date/Time: 04/12/24 0715    Procedures:       OPEN SURGERY - L3-4 laminectomy and facetectomy for decompression , L4-5 posterior column osteotomy , L5-S1 laminectomy and facetectomy for decompression with L4-5 and L5-S1 posterior lumbar interbody fusion using local bone autograft and BMP with titanium interbody cage placement and L2-S1 instrumentation and fusion with pelvic fixation. (Bilateral) - Please add [id number] for Decompression at L5-S1 and      Decompression at L5-S1 and    Location: [facility] OR 26 / [facility] OR    Surgeons: Pop Whelan MD            Relevant Problems   Neuro   (+) Lumbar radiculopathy, chronic      Musculoskeletal   (+) Idiopathic scoliosis in adult patient   (+) Lumbar foraminal stenosis   (+) Scoliosis of thoracolumbar region due to degenerative disease of spine in adult       Clinical information reviewed:   Tobacco  Allergies  Meds   Med Hx  Surg Hx  OB Status  Fam Hx  Soc   Hx        NPO Detail:  NPO/Void Status  Date of Last Liquid: 04/12/24  Time of Last Liquid: 0000  Date of Last Solid: 04/12/24  Time of Last Solid: 0000         PHYSICAL EXAM    Anesthesia Plan    History of general anesthesia?: yes  History of complications of general anesthesia?: no    ASA 3     general     Anesthetic plan and risks discussed with patient.  Use of blood products discussed with patient who consented to blood products.    Plan discussed with attending and resident.        Attending Anesthesiologist Note  Above information reviewed including relevant HPI, PMHx, PSHx, anesthesia history, labs, and imaging.    Summary (from patient interview and chart review):     Patient is a 74yo F with scheduled L3-L4 laminectomy and facetectomy for decompression, L4-L5 posterior column osteotomy, L5-S1 laminectomy and facetectomy for decompression  with L4-5 and L5-S1 posterior lumbar interbody fusion with bone autograft and BMP " with titanium interbody cage placement and L2 S1 instrumentation and fusion with pelvic fixation on April 12, 2024 for treatment of lumbar spinal stenosis.      PMH osteoarthritis, fibromyalgia, GERD, hyperlipidemia, hypertension, remote nephrolithiasis        Relevant Problems   Neuro   (+) Lumbar radiculopathy, chronic      Musculoskeletal   (+) Idiopathic scoliosis in adult patient   (+) Lumbar foraminal stenosis   (+) Scoliosis of thoracolumbar region due to degenerative disease of spine in adult        Medication Documentation Review Audit       Reviewed by Lore Jones RN (Registered Nurse) on 04/12/24 at 0640      Medication Order Taking? Sig Documenting Provider Last Dose Status   atorvastatin (Lipitor) 10 mg tablet [id number] Yes Take 1 tablet (10 mg) by mouth once daily. Silva Hyde MD 4/11/2024 Active   brimonidine-timoloL (Combigan) 0.2-0.5 % ophthalmic solution [id number] Yes Place 1 drop into affected eye(s) twice daily Silva Hyde MD 4/12/2024 Active   calcium carbonate 600 mg calcium (1,500 mg) tablet [id number] Yes 1 (one) time each day at the same time. Silva Hyde MD Past Week Active   chlorhexidine (Peridex) 0.12 % solution [id number] Yes Swish and spit 15 mL night before surgery and morning of surgery Samantha A Meeson, APRN-CNP 4/12/2024 Active   cholecalciferol (Vitamin D-3) 25 MCG (1000 UT) capsule [id number] Yes Take 1 capsule (25 mcg) by mouth once daily. Silva Hyde MD Past Week Active   cranberry 400 mg capsule [id number] Yes Take 1 capsule by mouth once daily. Silva Hyde MD Past Week Active   cyanocobalamin (Vitamin B-12) 1,000 mcg tablet [id number] Yes Take 5 tablets (5,000 mcg) by mouth once daily. Silva Hyde MD Past Week Active   losartan (Cozaar) 50 mg tablet [id number] Yes Take 1 tablet (50 mg) by mouth once daily. Silva Hyde MD Past Week Active   meloxicam (Mobic) 7.5 mg tablet [id number] Yes Take 1 tablet (7.5  "mg) by mouth twice a day. Historical Provider, MD Past Week Active   milk thistle 175 mg tablet [id number] Yes Take 1 tablet (175 mg) by mouth once daily. Historical Provider, MD Past Week Active   pantoprazole (ProtoNix) 40 mg EC tablet [id number] Yes Take 1 tablet (40 mg) by mouth once daily. Historical Provider, MD Past Week Active   pregabalin (Lyrica) 75 mg capsule [id number]  Take 1 capsule (75 mg) by mouth once daily at bedtime. Historical Provider, MD   24 235   romosozumab-aqqg (Evenity) 105 mg/1.17 mL syringe [id number] Yes Inject 2 Syringes (210 mg) under the skin every 30 (thirty) days. Historical Provider, MD Past Week Active                    Visit Vitals  /90   Pulse 73   Temp 36 °C (96.8 °F) (Temporal)   Resp 16   Ht 1.6 m (5' 2.99\")   Wt 54.8 kg (120 lb 13 oz)   SpO2 96%   BMI 21.41 kg/m²   OB Status Hysterectomy   Smoking Status Never   BSA 1.56 m²            2023    11:38 AM 3/7/2024    11:16 AM 3/25/2024     1:08 PM 3/25/2024     1:35 PM 2024     6:41 AM   Vitals   Systolic 185 197 155 150 142   Diastolic 99 100 97 82 90   Heart Rate 78 75 81  73   Temp   37.1 °C (98.7 °F)  36 °C (96.8 °F)   Resp 18 20   16   Height (in) 1.575 m (5' 2\") 1.575 m (5' 2\") 1.6 m (5' 3\")  1.6 m (5' 2.99\")   Weight (lb) 120 120 120.9  120.81   BMI 21.95 kg/m2 21.95 kg/m2 21.42 kg/m2  21.41 kg/m2   BSA (m2) 1.54 m2 1.54 m2 1.56 m2  1.56 m2   Visit Report Report Report           Recent Labs:    Chemistry    Lab Results   Component Value Date/Time     2024 1343    K 4.2 2024 1343     2024 1343    CO2 30 2024 1343    BUN 21 2024 1343    CREATININE 0.52 2024 1343    Lab Results   Component Value Date/Time    CALCIUM 10.2 2024 1343          Lab Results   Component Value Date/Time    WBC 5.5 2024 1343    HGB 14.5 2024 1343    HCT 44.4 2024 1343     2024 1343     Lab Results   Component Value Date/Time    PROTIME " 12.2 03/25/2024 1343    INR 1.1 03/25/2024 1343       Electrocardiogram:  Encounter Date: 03/25/24   ECG 12 Lead   Result Value    Ventricular Rate 78    Atrial Rate 78    MO Interval 184    QRS Duration 128    QT Interval 410    QTC Calculation(Bazett) 467    P Axis 18    R Axis 1    T Axis 34    QRS Count 13    Q Onset 219    P Onset 127    P Offset 178    T Offset 424    QTC Fredericia 447    Narrative    Normal sinus rhythm  Right bundle branch block  Abnormal ECG  No previous ECGs available  Confirmed by Alonso Galeano ([id number]) on 3/26/2024 10:52:46 PM     Anesthesia History: PONV  Anesthesia Plan: Preop acetaminophen. Preop SHAYY blocks. GA/ETT, std monitors + a-line, PIV x2, ketamine, methadone, TXA. Blood products in OR.     I discussed the anesthesia plan with the patient and/or family and reviewed the risks, benefits and alternatives. Agree to proceed.  Franko Leon MD PhD

## 2024-04-12 NOTE — CONSULTS
Consults  Acute Pain Service    Ashley Mcnair is a 73 y.o. year old female patient who presents for open Laminectomy and facetectomy for decompression with Dr. Davis. Acute Pain consulted for block for postoperative pain control.     Anticipated Postop Pain Issues -   Palliative: typically relieved with IV analgesics and regional local anesthetics  Provocative: typically with movement  Quality: typically burning and aching  Radiation: typically none  Severity: typically severe 8-10/10  Timing: typically constant    Past Medical History:   Diagnosis Date    Arthritis     Back pain     Right L3-L4 Transforaminal Epidural Steroid injection under fluoroscopic guidance on 23    Chronic pain disorder     Corneal abrasion     Degenerative scoliosis     Fibromyalgia, primary     controlled on mobic    GERD (gastroesophageal reflux disease)     managed with protonix    History of shingles     Hyperlipidemia     f/w pcp, managed with Lipitor    Hypertension     f/w pcp, not currently on medication    Nephrolithiasis     passed naturally    PONV (postoperative nausea and vomiting)     Radiculopathy     Scheduled for surgery with Dr. Whelan on 24    Retinal detachment     Shingles     Spinal stenosis     Tinnitus of left ear     Vertigo     Vision loss     left eye d/t injury        Past Surgical History:   Procedure Laterality Date    ADENOIDECTOMY       SECTION, LOW TRANSVERSE      x3 , ,     EYE SURGERY Left     pupil repair    FOOT Right     HYSTERECTOMY      TONSILLECTOMY          Family History   Problem Relation Name Age of Onset    No Known Problems Mother      Heart disease Father          Social History     Socioeconomic History    Marital status:      Spouse name: Not on file    Number of children: Not on file    Years of education: Not on file    Highest education level: Not on file   Occupational History    Not on file   Tobacco Use    Smoking status: Never    Smokeless  tobacco: Never   Vaping Use    Vaping status: Never Used   Substance and Sexual Activity    Alcohol use: Not Currently    Drug use: Never    Sexual activity: Defer   Other Topics Concern    Not on file   Social History Narrative    Not on file     Social Determinants of Health     Financial Resource Strain: Not on file   Food Insecurity: Not on file   Transportation Needs: Not on file   Physical Activity: Not on file   Stress: Not on file   Social Connections: Not on file   Intimate Partner Violence: Not on file   Housing Stability: Not on file        Allergies   Allergen Reactions    Sulfa (Sulfonamide Antibiotics) Swelling         Review of Systems  Gen: No fatigue, anorexia, insomnia, fever.   Eyes: No vision loss, double vision, drainage, eye pain.   ENT: No pharyngitis, dry mouth, no hearing changes or ear discharge  Cardiac: No chest pain, palpitations, syncope, near syncope.   Pulmonary: No shortness of breath, cough, hemoptysis.   Heme/lymph: No swollen glands, fever, bleeding.   GI: No abdominal pain, change in bowel habits, melena, hematemesis, hematochezia, nausea, vomiting, diarrhea.   : No discharge, dysuria, frequency, urgency, hematuria.  Endo: No polyuria or weight loss.   Musculoskeletal: Negative for any pain or loss of ROM/weakness  Skin: No rashes or lesions  Neuro: Normal speech, no numbness or weakness. No gait difficulties  Review of systems is otherwise negative unless stated above or in history of present illness.    Physical Exam:  Constitutional:  no distress, alert and cooperative  Eyes: clear sclera  Head/Neck: No apparent injury, trachea midline  Respiratory/Thorax: Patent airways, thorax symmetric, breathing comfortably  Cardiovascular: no pitting edema  Gastrointestinal: Nondistended  Musculoskeletal: ROM intact  Extremities: no clubbing  Neurological: alert, rivera x4  Psychological: Appropriate affect    No results found for this or any previous visit (from the past 24 hour(s)).      Ashley Mcnair is a 73 y.o. year old female patient who presents for open Laminectomy and facetectomy for decompression with Dr. Davis. Acute Pain consulted for block for postoperative pain control.     Plan:  - Bialteral erector spinae single shot blocks performed preoperatively on 04/12/24  - Pain medications per primary team  - Will see on POD1 if inpatient    Acute Pain Team  pg [phone] ph [phone].

## 2024-04-12 NOTE — PROCEDURES
Peripheral Block    Patient location during procedure: pre-op  Start time: 4/12/2024 7:12 AM  End time: 4/12/2024 7:29 AM  Reason for block: post-op pain management  Staffing  Performed: resident   Authorized by: Mario Chowdary MD    Performed by: Caitlyn Kemp MD  Preanesthetic Checklist  Completed: patient identified, IV checked, site marked, risks and benefits discussed, surgical consent, monitors and equipment checked, pre-op evaluation and timeout performed   Timeout performed at: 4/12/2024 7:12 AM  Peripheral Block  Patient position: sitting  Prep: ChloraPrep  Patient monitoring: heart rate and continuous pulse ox  Block type: SHAYY  Laterality: B/L  Injection technique: catheter  Guidance: ultrasound guided  Local infiltration: ropivacaine  Needle  Needle type: Tuohy   Needle gauge: 26 G  Needle length: 8 cm  Needle localization: ultrasound guidance     image stored in chart  Assessment  Injection assessment: negative aspiration for heme, no paresthesia on injection, incremental injection and local visualized surrounding nerve on ultrasound  Additional Notes  Erector spinae plane block: Informed consent obtained.  Risks, benefits, and alternatives discussed.  ASA monitors placed, and timeout performed.  Patient positioned, prepped with chlorhexidine, and draped with sterile towels.  Ultrasound guidance used to visualize the erector spine a muscle above the TP/costal junction at L4.  Good visualization of the needle throughout duration of the procedure.  Aspiration was negative.  15 cc of 0.5 percent ropivacaine injected with visualization of spread bilaterally.  Catheters threaded and secured. Patient tolerated procedure well.    Timeout by CHADWICK Jones

## 2024-04-12 NOTE — SIGNIFICANT EVENT
Call to Neurosurg team d/t patients left eye pupil irregular and non reactive to light. Patient has history of pupil surgery, surgical team not concerned.

## 2024-04-12 NOTE — ANESTHESIA PROCEDURE NOTES
Arterial Line:    Date/Time: 4/12/2024 9:21 AM    Staffing  Performed: resident   Authorized by: Franko Leon MD PhD    Performed by: Alireza Finch MD    An arterial line was placed. Procedure performed using surface landmarks.in the OR for the following indication(s): continuous blood pressure monitoring and blood sampling needed.    A 20 gauge (size), 1 and 3/8 inch (length), Angiocath (type) catheter was placed into the Left radial artery, secured by Tegaderm,   Seldinger technique used.  Events:  patient tolerated procedure well with no complications.

## 2024-04-12 NOTE — BRIEF OP NOTE
"Date: 2024  OR Location: [facility] OR    Name: Ashley Mcnair \"Alysa\", : 1950, Age: 73 y.o., MRN: [id number], Sex: female    Diagnosis  Pre-op Diagnosis     * Lumbar foraminal stenosis [M48.061]     * Lumbar radiculopathy, chronic [M54.16]     * Scoliosis of thoracolumbar region due to degenerative disease of spine in adult [M41.55]     * Other chronic back pain [M54.9, G89.29] Post-op Diagnosis     * Lumbar foraminal stenosis [M48.061]     * Lumbar radiculopathy, chronic [M54.16]     * Scoliosis of thoracolumbar region due to degenerative disease of spine in adult [M41.55]     * Other chronic back pain [M54.9, G89.29]     Procedures  T10-S1 pedicle screw instrumentation with bilateral pelvic screw fixation, L1-S1 posterior column osteotomy, L4-5 and L5-S1 transforaminal lumbar cage placement, posterolateral arthrodesis, use of allograft and autograft, use of bone morphogenic protein      Surgeons      * Pop Whelan - Primary    Resident/Fellow/Other Assistant:  Surgeons and Role:     * Elia Kim MD - Resident - Assisting    Procedure Summary  Anesthesia: General  ASA: III  Anesthesia Staff: Anesthesiologist: Franko Leon MD PhD  Anesthesia Resident: Alireza Finch MD  Estimated Blood Loss: 1500mL  Intra-op Medications:   Administrations occurring from 0715 to 1400 on 24:   Medication Name Total Dose   gelatin absorbable (Gelfoam) 100 sponge 1 each   thrombin (recombinant) (Recothrom) topical solution 10,000 Units   polymyxin B injection 500,000 Units   vancomycin (Vancocin) vial for injection 1 g   ceFAZolin (Ancef) injection 1 g   lidocaine-epinephrine (Xylocaine W/EPI) 1 %-1:100,000 injection 20 mL   sodium chloride 0.9 % irrigation solution 2,000 mL   ketamine (Ketalar) 500 mg in dextrose 5 % in water (D5W) 250 mL (2 mg/mL) infusion 137.91 mg   methadone (Dolophine) injection 10 mg 10 mg              Anesthesia Record               Intraprocedure I/O Totals          Intake "    PRBC 700.00 mL    Ketamine 0.00 mL    The total shown is the total volume documented since Anesthesia Start was filed.    Remifentanil Drip 0.00 mL    The total shown is the total volume documented since Anesthesia Start was filed.    Tranexamic Acid 0.00 mL    The total shown is the total volume documented since Anesthesia Start was filed.    Propofol Drip 0.00 mL    The total shown is the total volume documented since Anesthesia Start was filed.    Phenylephrine Drip 0.00 mL    The total shown is the total volume documented since Anesthesia Start was filed.    Total Intake 700 mL       Output    Urine 1700 mL    Total Output 1700 mL       Net    Net Volume -1000 mL          Specimen: No specimens collected     Staff:   Circulator: Danielle Little RN; Rebeka Gupta RN  Relief Scrub: Ivan Islas  Scrub Person: Mora Dumont          Findings: good correction    Complications:  None; patient tolerated the procedure well.     Disposition: PACU - hemodynamically stable.  Condition: stable  Specimens Collected: No specimens collected  Attending Attestation:     Pop Whelan  Phone Number: [phone]

## 2024-04-12 NOTE — OP NOTE
"OPEN SURGERY - L3-4 laminectomy and facetectomy for decompression , L1-2, L2-3 and L4-5 posterior column osteotomy , L5-S1 laminectomy and facetectomy for decompression with L4-5 and L5-S1 posterior lumbar interbody fusion using local bone autograft and BMP with titanium interbody cage placement and T10-S1 instrumentation and fusion with pelvic fixation- Operative Note     Date: 2024  OR Location: [facility] OR    Name: Ashley Mcnair \"Alysa\", : 1950, Age: 73 y.o., MRN: [id number], Sex: female    Diagnosis  Pre-op Diagnosis     * Lumbar foraminal stenosis [M48.061]     * Lumbar radiculopathy, chronic [M54.16]     * Scoliosis of thoracolumbar region due to degenerative disease of spine in adult [M41.55]     * Other chronic back pain [M54.9, G89.29] Post-op Diagnosis     * Lumbar foraminal stenosis [M48.061]     * Lumbar radiculopathy, chronic [M54.16]     * Scoliosis of thoracolumbar region due to degenerative disease of spine in adult [M41.55]     * Other chronic back pain [M54.9, G89.29]       POSTOPERATIVE DIAGNOSIS:  Same    OPERATION/PROCEDURE:    Posterior Column Osteotomy at L1-2, L2-3, L4-5   L3/4  Laminectomy and Facetectomy for Decompression  L5-S1 Laminectomies/Foraminotomies/Factectomies for Decompression of Nerve Roots   L4-5 and L5-S1 Combined Posterolateral and Transforaminal Interbody Fusion (TLIF)    Synthetic Titanium Cage Device L4-5 and L5-S1   Posterolateral Fusion T10 - S1   Posterior Segmental Instrumentation T1o to S1 with Pelvic Fixation,   Local Bone Graft, Synthetic Bone Graft Extender/Substitute/Osteopromotive Material   Use of intraoperative image-guided computer-assisted navigation.     SURGEON:    Pop Whelan MD    RESIDENT:  Elia Kim MD    MEDICATIONS:    Antibiotic prophylaxis given within one hour prior to skin incision.    PROPHYLAXIS:    Venous thromboembolism prophylaxis was ordered at the time of surgery in the form of mechanical prophylaxis using " sequential compression devices.    INDICATION:  Ms. Mcnair is a 73 y.o. female who presented with intractable low back and lower extremity symptoms with good correlation between the imaging studies and clinical signs and symptoms.  The patient failed all conservative treatment. In view of the presence of significant symptoms affecting the activities of daily living to a significant extent,  surgical treatment was discussed with the goals, risk and benefits of surgery and the fact that surgery may or may not alleviate the symptoms completely with small chances of even worsening of the symptoms.  The patient chose to proceed with surgery after clear understanding of all the risk and benefits and goals of surgery.  Discussed with the patient and the family about the pros and cons of all the available allograft options including DBM and rhBMP and they agreed to the use of any of those understanding everything. Discussed specifically the role of rhBMP for obtaining solid arthrodesis despite an off label use given the fact that we may possibly have to extend instrumentation up to T10 because she has evidence of significant type C coronal imbalance on her scoliosis films. They asked me to use what I would consider the best depending on the need of the patient based for optimal healing and intraoperative findings regardless of insurance approval.     TECHNIQUE:    After induction of general anesthesia, the patient was carefully turned prone on a Mumtaz table and all dependent portions were carefully padded. Neuromonitoring was instituted using SSEP, and the free running EMGs. The thoracolumbar region was scrubbed, prepped, and draped in the usual sterile fashion.  A midline skin incision was marked out from L2-S1 based on lateral fluoroscopy.  The intraoperative x-ray shows presence of significant scoliosis with lumbosacral fractional curve and coronal deformity extending all the way up to L1 and hands the incision was  extended up to T10  based on lateral fluoroscopy instead of S1. This was injected with local anesthesia and carried out sharply.  Subcutaneous tissue divided using monopolar cautery over the tips of the spinous process and carried out all the way to the tip of the transverse processes of T10 through L5 and over the sacral ala and dorsal sacrum bilaterally including upto S2 level. Spinous process clamp was attached to the T10 spinous process and the dynamic reference array was attached to this. The O-arm was brought into the field and intraoperative CT scan performed and the images transferred to the Solarus system for use in intraoperative image-guided computer-assisted navigation. The navigation system was then used to place the instrumentation at the bilateral pedicles from T10 to S1 using Belsito Mediane OPEN instrumented system. Placement of iliac fixation was performed using the traditional iliac screw trajectory on the right side and S2 AI trajectory on the left side.  An additional iliac screw was placed on the left for placement of a kickstand in view of the presence of the significant of coronal deformity with type C coronal imbalance.  Neuro monitoring was performed throughout the placement of instrumentation and that worse no abnormal electrophysiological activity detected.  Attention was then directed to performance of the TLIFs. The inferior edge of the lamina along with lateral border of the pars along with the L5-S1 facet joint identified. Complete Laminectomy and facetectomy removing the inferior facet of L5 and superior facet of S1 was performed using drill, osteotomes and kerrison rongeurs to decompress the nerves roots. Ligamentum flavum was resected thereby decompressing the neural elements from the L5 pedicle to S1 pedicle. The decompression of the neural elements performed and the degree of bony removal necessary for the same was beyond and significantly more than that would otherwise  be necessary to access the interbody space for interbody arthrodesis at the level. The disk space was accessed on the left hand side and incised sharply. Using a series of curettes and pituitary rongeurs, an aggressive and complete discectomy was performed. After completing the diskectomy, the endplates of L5 and S1 were prepared for interbody arthrodesis. The wound was irrigated and hemostasis achieved. Given the presence of significant deformity with requirement of fusion from T10 to pelvis and presence of osteoporosis, it was considered medically necessary decision to use bone morphogenetic protein to optimize her chances of solid arthrodesis and prevent  pseudarthrosis. The disk space was first packed with an rhBMP sponge and local morcellized autograft from the decompression as mentioned above. A titanium cage was packed with local autograft and inserted into the intervertebral space under fluoroscopy. AP and lateral fluoroscopy demonstrated the cage to be in excellent position.   A posterior column osteotomy was then performed at L4-5 level that involve removal of bilateral pars and inferior facet of L4 and superior facet of L5 for decompression along with a correction of the lumbosacral fractional curve which exposed the L4-5 disc.  At this point complete L4-5 discectomy was then performed with preparation of the endplates for arthrodesis.  Subsequent to that small amount of BMP and local bone autograft was placed in the disc space. A titanium cage was packed with local autograft and inserted into the intervertebral space under fluoroscopy. AP and lateral fluoroscopy demonstrated the cage to be in excellent position.   L3/4 Laminectomy and facetectomy was then performed for decompression of the  nerve roots.    Given the presence of a significant coronal deformity extending all the way up to L1, Posterior Column osteotomies were performed at L1-2 , and L2-3  which involve removal of bilateral inferior pars,  residual lamina and inferior and superior articular facets at L1-2 and  L2-3 levels achieving a thorough central and bilateral foraminal decompression with removal of the ligamentum flavum that resulted in increased flexibility of the spine to allow correction of the deformity.   Given the presence of significant scoliosis extending all the way up to the thoracolumbar junction that was very obvious intraoperatively, additional posterior column osteotomy at L1-L2 and L2-3 was performed along with extension of the instrumentation and fusion up to T10 as described above.    A very small pinhole dural tear was visualized during decompression given the significant additions between the dura that was very thinned out and the hypertrophied ligamentum flavum.  However at the end of the procedure there was no obvious cerebrospinal fluid leakage whatsoever.  Regardless given the fact that there was at some point cerebrospinal fluid leakage the area of pinhole dural tear was repaired using Surgicel Gelfoam and spinal glue.  Valsalva maneuver was performed with no evidence of any cerebrospinal fluid leak at the end of the procedure whatsoever.  Thorough irrigation was performed after achieving hemostasis. The posterolateral elements of T10 through S1 were decorticated using a high-speed drill. The rods were seated in the heads of the screws and through a combination of carlos reduction, translation, cantilever and compression with excellent correction of of coronal deformity The rods were secured in place using set caps and final tightened using the torque  and counter-torque.  A third carlos was placed on the left side by engaging the carlos on the tulip of the dual headed screw that was placed at L2 level on the left and second iliac screw that was placed on the left side.  This carlos also acted as a kickstand carlos for correction of the coronal deformity.  Final AP and lateral fluoroscopic images were taken to confirm satisfactory  implant  placement. The posterolateral arthrodesis from T10 - S1 was completed by  laying down morselized autograft that was obtained from the bony decompression and rhBMP. Vancomycin and ancef powder was scattered about the wound and two Hemovac drains tunneled out percutaneously from the wound. The wound was then closed in layers using a running #1 Vicryl for the muscle, 0- Vicryl for the fascia, interrupted inverted 2-0 Vicryl for the subcutaneous layer, and glue  for the skin. The drapes were removed, sterile dressing applied. The patient was  turned back supine. There were no changes in the neuro monitoring throughout the course of the operation.Please also note, I was scrubbed, present and performed the entire procedure.  The EBL was about 1500 ml       Complications:  None; patient tolerated the procedure well.    Disposition: PACU - hemodynamically stable.  Condition: stable         Attending Attestation: I was present for the entire procedure.    Pop Whelan  Phone Number: [phone]

## 2024-04-12 NOTE — ANESTHESIA PROCEDURE NOTES
Airway  Date/Time: 4/12/2024 7:53 AM  Urgency: elective    Airway not difficult    Staffing  Performed: resident   Authorized by: Franko Leon MD PhD    Performed by: Alireza Finch MD  Patient location during procedure: OR    Indications and Patient Condition  Indications for airway management: anesthesia  Spontaneous Ventilation: absent  Sedation level: deep  Preoxygenated: yes  Patient position: sniffing  Mask difficulty assessment: 1 - vent by mask  Planned trial extubation    Final Airway Details  Final airway type: endotracheal airway      Successful airway: ETT  Cuffed: yes   Successful intubation technique: direct laryngoscopy  Facilitating devices/methods: intubating stylet  Endotracheal tube insertion site: oral  Blade: Romana  Blade size: #3  ETT size (mm): 7.0  Cormack-Lehane Classification: grade I - full view of glottis  Placement verified by: capnometry   Measured from: gums  ETT to gums (cm): 21  Number of attempts at approach: 1

## 2024-04-13 LAB
ALBUMIN SERPL BCP-MCNC: 3.4 G/DL (ref 3.4–5)
ANION GAP SERPL CALC-SCNC: 14 MMOL/L (ref 10–20)
ANION GAP SERPL CALC-SCNC: 14 MMOL/L (ref 10–20)
ANION GAP SERPL CALC-SCNC: 15 MMOL/L (ref 10–20)
ANION GAP SERPL CALC-SCNC: 9 MMOL/L (ref 10–20)
BLOOD EXPIRATION DATE: NORMAL
BUN SERPL-MCNC: 18 MG/DL (ref 6–23)
BUN SERPL-MCNC: 19 MG/DL (ref 6–23)
BUN SERPL-MCNC: 27 MG/DL (ref 6–23)
BUN SERPL-MCNC: 32 MG/DL (ref 6–23)
CALCIUM SERPL-MCNC: 7.5 MG/DL (ref 8.6–10.6)
CALCIUM SERPL-MCNC: 7.8 MG/DL (ref 8.6–10.6)
CALCIUM SERPL-MCNC: 7.9 MG/DL (ref 8.6–10.6)
CALCIUM SERPL-MCNC: 7.9 MG/DL (ref 8.6–10.6)
CHLORIDE SERPL-SCNC: 105 MMOL/L (ref 98–107)
CHLORIDE SERPL-SCNC: 106 MMOL/L (ref 98–107)
CHLORIDE SERPL-SCNC: 108 MMOL/L (ref 98–107)
CHLORIDE SERPL-SCNC: 110 MMOL/L (ref 98–107)
CO2 SERPL-SCNC: 23 MMOL/L (ref 21–32)
CO2 SERPL-SCNC: 25 MMOL/L (ref 21–32)
CO2 SERPL-SCNC: 25 MMOL/L (ref 21–32)
CO2 SERPL-SCNC: 27 MMOL/L (ref 21–32)
CREAT SERPL-MCNC: 0.53 MG/DL (ref 0.5–1.05)
CREAT SERPL-MCNC: 0.54 MG/DL (ref 0.5–1.05)
CREAT SERPL-MCNC: 0.83 MG/DL (ref 0.5–1.05)
CREAT SERPL-MCNC: 0.86 MG/DL (ref 0.5–1.05)
DISPENSE STATUS: NORMAL
EGFRCR SERPLBLD CKD-EPI 2021: 71 ML/MIN/1.73M*2
EGFRCR SERPLBLD CKD-EPI 2021: 75 ML/MIN/1.73M*2
EGFRCR SERPLBLD CKD-EPI 2021: >90 ML/MIN/1.73M*2
EGFRCR SERPLBLD CKD-EPI 2021: >90 ML/MIN/1.73M*2
ERYTHROCYTE [DISTWIDTH] IN BLOOD BY AUTOMATED COUNT: 14.6 % (ref 11.5–14.5)
ERYTHROCYTE [DISTWIDTH] IN BLOOD BY AUTOMATED COUNT: 14.6 % (ref 11.5–14.5)
ERYTHROCYTE [DISTWIDTH] IN BLOOD BY AUTOMATED COUNT: 14.7 % (ref 11.5–14.5)
ERYTHROCYTE [DISTWIDTH] IN BLOOD BY AUTOMATED COUNT: 15.1 % (ref 11.5–14.5)
FIBRINOGEN PPP-MCNC: 266 MG/DL (ref 200–400)
GLUCOSE SERPL-MCNC: 110 MG/DL (ref 74–99)
GLUCOSE SERPL-MCNC: 123 MG/DL (ref 74–99)
GLUCOSE SERPL-MCNC: 134 MG/DL (ref 74–99)
GLUCOSE SERPL-MCNC: 145 MG/DL (ref 74–99)
HCT VFR BLD AUTO: 24.7 % (ref 36–46)
HCT VFR BLD AUTO: 27.9 % (ref 36–46)
HCT VFR BLD AUTO: 30 % (ref 36–46)
HCT VFR BLD AUTO: 30.9 % (ref 36–46)
HGB BLD-MCNC: 10.1 G/DL (ref 12–16)
HGB BLD-MCNC: 10.3 G/DL (ref 12–16)
HGB BLD-MCNC: 8 G/DL (ref 12–16)
HGB BLD-MCNC: 9.4 G/DL (ref 12–16)
MCH RBC QN AUTO: 30.9 PG (ref 26–34)
MCH RBC QN AUTO: 30.9 PG (ref 26–34)
MCH RBC QN AUTO: 31.1 PG (ref 26–34)
MCH RBC QN AUTO: 31.3 PG (ref 26–34)
MCHC RBC AUTO-ENTMCNC: 32.4 G/DL (ref 32–36)
MCHC RBC AUTO-ENTMCNC: 32.7 G/DL (ref 32–36)
MCHC RBC AUTO-ENTMCNC: 33.7 G/DL (ref 32–36)
MCHC RBC AUTO-ENTMCNC: 34.3 G/DL (ref 32–36)
MCV RBC AUTO: 90 FL (ref 80–100)
MCV RBC AUTO: 92 FL (ref 80–100)
MCV RBC AUTO: 95 FL (ref 80–100)
MCV RBC AUTO: 96 FL (ref 80–100)
NRBC BLD-RTO: 0 /100 WBCS (ref 0–0)
PHOSPHATE SERPL-MCNC: 2.8 MG/DL (ref 2.5–4.9)
PLATELET # BLD AUTO: 150 X10*3/UL (ref 150–450)
PLATELET # BLD AUTO: 156 X10*3/UL (ref 150–450)
PLATELET # BLD AUTO: 160 X10*3/UL (ref 150–450)
PLATELET # BLD AUTO: 170 X10*3/UL (ref 150–450)
POTASSIUM SERPL-SCNC: 3.9 MMOL/L (ref 3.5–5.3)
POTASSIUM SERPL-SCNC: 3.9 MMOL/L (ref 3.5–5.3)
POTASSIUM SERPL-SCNC: 4 MMOL/L (ref 3.5–5.3)
POTASSIUM SERPL-SCNC: 4.2 MMOL/L (ref 3.5–5.3)
PRODUCT BLOOD TYPE: 6200
PRODUCT CODE: NORMAL
RBC # BLD AUTO: 2.59 X10*6/UL (ref 4–5.2)
RBC # BLD AUTO: 3.02 X10*6/UL (ref 4–5.2)
RBC # BLD AUTO: 3.23 X10*6/UL (ref 4–5.2)
RBC # BLD AUTO: 3.33 X10*6/UL (ref 4–5.2)
SODIUM SERPL-SCNC: 140 MMOL/L (ref 136–145)
SODIUM SERPL-SCNC: 141 MMOL/L (ref 136–145)
SODIUM SERPL-SCNC: 142 MMOL/L (ref 136–145)
SODIUM SERPL-SCNC: 142 MMOL/L (ref 136–145)
UNIT ABO: NORMAL
UNIT NUMBER: NORMAL
UNIT RH: NORMAL
UNIT VOLUME: 350
WBC # BLD AUTO: 12.8 X10*3/UL (ref 4.4–11.3)
WBC # BLD AUTO: 15.2 X10*3/UL (ref 4.4–11.3)
WBC # BLD AUTO: 15.4 X10*3/UL (ref 4.4–11.3)
WBC # BLD AUTO: 16 X10*3/UL (ref 4.4–11.3)
XM INTEP: NORMAL

## 2024-04-13 PROCEDURE — 80069 RENAL FUNCTION PANEL: CPT | Performed by: STUDENT IN AN ORGANIZED HEALTH CARE EDUCATION/TRAINING PROGRAM

## 2024-04-13 PROCEDURE — 80048 BASIC METABOLIC PNL TOTAL CA: CPT | Mod: CCI | Performed by: STUDENT IN AN ORGANIZED HEALTH CARE EDUCATION/TRAINING PROGRAM

## 2024-04-13 PROCEDURE — 97110 THERAPEUTIC EXERCISES: CPT | Mod: GP | Performed by: PHYSICAL THERAPIST

## 2024-04-13 PROCEDURE — 97162 PT EVAL MOD COMPLEX 30 MIN: CPT | Mod: GP | Performed by: PHYSICAL THERAPIST

## 2024-04-13 PROCEDURE — 2500000001 HC RX 250 WO HCPCS SELF ADMINISTERED DRUGS (ALT 637 FOR MEDICARE OP): Performed by: STUDENT IN AN ORGANIZED HEALTH CARE EDUCATION/TRAINING PROGRAM

## 2024-04-13 PROCEDURE — 2500000004 HC RX 250 GENERAL PHARMACY W/ HCPCS (ALT 636 FOR OP/ED): Performed by: STUDENT IN AN ORGANIZED HEALTH CARE EDUCATION/TRAINING PROGRAM

## 2024-04-13 PROCEDURE — 36415 COLL VENOUS BLD VENIPUNCTURE: CPT

## 2024-04-13 PROCEDURE — 97165 OT EVAL LOW COMPLEX 30 MIN: CPT | Mod: GO

## 2024-04-13 PROCEDURE — 85027 COMPLETE CBC AUTOMATED: CPT | Performed by: STUDENT IN AN ORGANIZED HEALTH CARE EDUCATION/TRAINING PROGRAM

## 2024-04-13 PROCEDURE — 37799 UNLISTED PX VASCULAR SURGERY: CPT | Performed by: STUDENT IN AN ORGANIZED HEALTH CARE EDUCATION/TRAINING PROGRAM

## 2024-04-13 PROCEDURE — 80048 BASIC METABOLIC PNL TOTAL CA: CPT | Mod: CCI

## 2024-04-13 PROCEDURE — 1100000001 HC PRIVATE ROOM DAILY

## 2024-04-13 PROCEDURE — 97530 THERAPEUTIC ACTIVITIES: CPT | Mod: GO

## 2024-04-13 PROCEDURE — 85027 COMPLETE CBC AUTOMATED: CPT

## 2024-04-13 PROCEDURE — 99231 SBSQ HOSP IP/OBS SF/LOW 25: CPT | Performed by: STUDENT IN AN ORGANIZED HEALTH CARE EDUCATION/TRAINING PROGRAM

## 2024-04-13 PROCEDURE — 85384 FIBRINOGEN ACTIVITY: CPT

## 2024-04-13 PROCEDURE — P9016 RBC LEUKOCYTES REDUCED: HCPCS

## 2024-04-13 PROCEDURE — 36430 TRANSFUSION BLD/BLD COMPNT: CPT

## 2024-04-13 PROCEDURE — 2500000005 HC RX 250 GENERAL PHARMACY W/O HCPCS: Performed by: STUDENT IN AN ORGANIZED HEALTH CARE EDUCATION/TRAINING PROGRAM

## 2024-04-13 RX ORDER — OXYCODONE HYDROCHLORIDE 5 MG/1
10 TABLET ORAL EVERY 4 HOURS PRN
Status: DISCONTINUED | OUTPATIENT
Start: 2024-04-13 | End: 2024-04-14

## 2024-04-13 RX ORDER — HEPARIN SODIUM 5000 [USP'U]/ML
5000 INJECTION, SOLUTION INTRAVENOUS; SUBCUTANEOUS EVERY 8 HOURS
Status: DISCONTINUED | OUTPATIENT
Start: 2024-04-13 | End: 2024-04-17 | Stop reason: HOSPADM

## 2024-04-13 RX ORDER — HYDROMORPHONE HYDROCHLORIDE 1 MG/ML
0.2 INJECTION, SOLUTION INTRAMUSCULAR; INTRAVENOUS; SUBCUTANEOUS
Status: DISCONTINUED | OUTPATIENT
Start: 2024-04-13 | End: 2024-04-14

## 2024-04-13 RX ORDER — OXYCODONE HYDROCHLORIDE 5 MG/1
5 TABLET ORAL EVERY 4 HOURS PRN
Status: DISCONTINUED | OUTPATIENT
Start: 2024-04-13 | End: 2024-04-14

## 2024-04-13 RX ADMIN — CYCLOBENZAPRINE 5 MG: 10 TABLET, FILM COATED ORAL at 10:00

## 2024-04-13 RX ADMIN — ACETAMINOPHEN 650 MG: 325 TABLET ORAL at 04:47

## 2024-04-13 RX ADMIN — PANTOPRAZOLE SODIUM 40 MG: 40 TABLET, DELAYED RELEASE ORAL at 06:38

## 2024-04-13 RX ADMIN — HEPARIN SODIUM 5000 UNITS: 5000 INJECTION INTRAVENOUS; SUBCUTANEOUS at 12:40

## 2024-04-13 RX ADMIN — KETOROLAC TROMETHAMINE 15 MG: 15 INJECTION, SOLUTION INTRAMUSCULAR; INTRAVENOUS at 00:38

## 2024-04-13 RX ADMIN — KETOROLAC TROMETHAMINE 15 MG: 15 INJECTION, SOLUTION INTRAMUSCULAR; INTRAVENOUS at 06:38

## 2024-04-13 RX ADMIN — POLYETHYLENE GLYCOL 3350 17 G: 17 POWDER, FOR SOLUTION ORAL at 10:00

## 2024-04-13 RX ADMIN — SODIUM CHLORIDE, POTASSIUM CHLORIDE, SODIUM LACTATE AND CALCIUM CHLORIDE 100 ML/HR: 600; 310; 30; 20 INJECTION, SOLUTION INTRAVENOUS at 03:45

## 2024-04-13 RX ADMIN — OXYCODONE HYDROCHLORIDE 5 MG: 5 TABLET ORAL at 20:40

## 2024-04-13 RX ADMIN — LIDOCAINE 2 PATCH: 4 PATCH TOPICAL at 10:00

## 2024-04-13 RX ADMIN — KETOROLAC TROMETHAMINE 15 MG: 15 INJECTION, SOLUTION INTRAMUSCULAR; INTRAVENOUS at 15:53

## 2024-04-13 RX ADMIN — HEPARIN SODIUM 5000 UNITS: 5000 INJECTION INTRAVENOUS; SUBCUTANEOUS at 04:47

## 2024-04-13 RX ADMIN — CYCLOBENZAPRINE 5 MG: 10 TABLET, FILM COATED ORAL at 20:32

## 2024-04-13 RX ADMIN — ATORVASTATIN CALCIUM 10 MG: 10 TABLET, FILM COATED ORAL at 10:00

## 2024-04-13 RX ADMIN — ACETAMINOPHEN 650 MG: 325 TABLET ORAL at 15:53

## 2024-04-13 RX ADMIN — ACETAMINOPHEN 650 MG: 325 TABLET ORAL at 20:39

## 2024-04-13 RX ADMIN — HEPARIN SODIUM 5000 UNITS: 5000 INJECTION INTRAVENOUS; SUBCUTANEOUS at 20:32

## 2024-04-13 RX ADMIN — KETOROLAC TROMETHAMINE 15 MG: 15 INJECTION, SOLUTION INTRAMUSCULAR; INTRAVENOUS at 10:00

## 2024-04-13 RX ADMIN — CYCLOBENZAPRINE 5 MG: 10 TABLET, FILM COATED ORAL at 15:53

## 2024-04-13 RX ADMIN — ACETAMINOPHEN 650 MG: 325 TABLET ORAL at 10:00

## 2024-04-13 RX ADMIN — POLYETHYLENE GLYCOL 3350 17 G: 17 POWDER, FOR SOLUTION ORAL at 20:33

## 2024-04-13 ASSESSMENT — COGNITIVE AND FUNCTIONAL STATUS - GENERAL
DRESSING REGULAR LOWER BODY CLOTHING: A LOT
DAILY ACTIVITIY SCORE: 18
DRESSING REGULAR UPPER BODY CLOTHING: A LITTLE
STANDING UP FROM CHAIR USING ARMS: A LITTLE
TOILETING: A LOT
CLIMB 3 TO 5 STEPS WITH RAILING: A LITTLE
WALKING IN HOSPITAL ROOM: A LITTLE
MOBILITY SCORE: 18
MOVING FROM LYING ON BACK TO SITTING ON SIDE OF FLAT BED WITH BEDRAILS: A LITTLE
TURNING FROM BACK TO SIDE WHILE IN FLAT BAD: A LITTLE
MOVING TO AND FROM BED TO CHAIR: A LITTLE
HELP NEEDED FOR BATHING: A LITTLE

## 2024-04-13 ASSESSMENT — PAIN - FUNCTIONAL ASSESSMENT
PAIN_FUNCTIONAL_ASSESSMENT: 0-10

## 2024-04-13 ASSESSMENT — PAIN SCALES - GENERAL
PAINLEVEL_OUTOF10: 0 - NO PAIN

## 2024-04-13 ASSESSMENT — ACTIVITIES OF DAILY LIVING (ADL)
BATHING_ASSISTANCE: MINIMAL
ADL_ASSISTANCE: INDEPENDENT
ADL_ASSISTANCE: INDEPENDENT

## 2024-04-13 NOTE — PROGRESS NOTES
"Occupational Therapy    Evaluation and Treatment    Patient Name: Ashley Mcnair \"Alysa\"  MRN: [id number]  Today's Date: 4/13/2024  Room: 18/18-A  Time Calculation  Start Time: 1026  Stop Time: 1054  Time Calculation (min): 28 min    Assessment  IP OT Assessment  OT Assessment: Ashley is a 72yo female presenting with deficits in ADLs, IADLs, transfers, bed mobility and functional mobility. Pt would benefit from skilled OT intervention to return to PLOF  Prognosis: Good  Barriers to Discharge: None  Evaluation/Treatment Tolerance: Patient tolerated treatment well  Medical Staff Made Aware: Yes  End of Session Communication: Bedside nurse  End of Session Patient Position: Up in chair, Alarm off, caregiver present  Plan:  Treatment Interventions: ADL retraining, Functional transfer training, UE strengthening/ROM, Endurance training, Compensatory technique education, Equipment evaluation/education, Patient/family training  OT Frequency: 3 times per week  OT Discharge Recommendations: Low intensity level of continued care  Equipment Recommended upon Discharge: Wheeled walker  OT Recommended Transfer Status: Assist of 1  OT - OK to Discharge: Yes    Subjective   Current Problem:  1. Idiopathic scoliosis in adult patient        2. Acute postoperative pain  Peripheral Block    Peripheral Block      3. Lumbar foraminal stenosis        4. Lumbar radiculopathy, chronic        5. Scoliosis of thoracolumbar region due to degenerative disease of spine in adult        6. Other chronic back pain          General:  Reason for Referral: p/w progressive LBP, RLE radiculopathy, worsening coronal scoliosis, 4/12 s/p T10  - pelvis instrumentation, L1-S1 PCOs, L4/5, L5/S1 TLIF  Past Medical History Relevant to Rehab: GERD, HLD, fibromyalgia, HTN  Co-Treatment: PT  Co-Treatment Reason: to maximize therapeutic activity and patient safety  Prior to Session Communication: Bedside nurse  Patient Position Received: Bed, 3 rail up, Alarm off, " not on at start of session  Family/Caregiver Present: Yes  Caregiver Feedback: spouse Jasson present and supportive  General Comment: Pt supine in bed upon entry to room. pt pleasant and willing to work with therapy. Pt reports dizziness with position changes, extended time for transitional movements 2/2 safety.   Precautions:  Medical Precautions: Fall precautions  Post-Surgical Precautions: Spinal precautions  Vital Signs:  Heart Rate: 92 (88 post)  Resp: 17 (17)  SpO2: 99 % (97 post)  BP: 87/62 (99/64 bed in chair, 102/62 EOB, 94/62 in chair post session. RN notified and aware of soft BP)  MAP (mmHg): 71 (72 post. Maintains >65 throughout)  Pain:  Pain Assessment  Pain Assessment: 0-10  Pain Score: 0 - No pain  Lines/Tubes/Drains:  Urethral Catheter Non-latex;Straight-tip 16 Fr. (Active)   Number of days: 1       Closed/Suction Drain 1 Midline;Right Back Accordion (Active)   Number of days: 1       Closed/Suction Drain 2 Midline;Left Back Accordion (Active)   Number of days: 1         Objective   Cognition:  Overall Cognitive Status: Within Functional Limits  Orientation Level: Oriented X4           Home Living:  Type of Home: House  Lives With: Spouse  Home Adaptive Equipment: Walker rolling or standard, Reacher (standard walker, adjustable bed)  Home Layout: Two level, Laundry in basement, Able to live on main level with bedroom/bathroom, Full bath main level  Home Access: Stairs to enter with rails  Entrance Stairs-Rails: None  Entrance Stairs-Number of Steps: 1  Bathroom Shower/Tub: Tub/shower unit  Bathroom Toilet: Standard  Bathroom Equipment: Grab bars in shower, Shower chair without back   Prior Function:  Level of [place]: Independent with ADLs and functional transfers, Independent with homemaking with ambulation  Receives Help From:  (spouse and family can assist as needed)  ADL Assistance: Independent  Homemaking Assistance: Independent  Ambulatory Assistance: Independent  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)  Leisure: active, family has a farm  Hand Dominance: Right  Prior Function Comments: no falls, drives     ADL:  Eating Assistance: Independent (anticipate)  Grooming Assistance: Independent (anticipate)  Bathing Assistance: Minimal (anticipate)  UE Dressing Assistance:  (anticipate s/u)  LE Dressing Assistance: Moderate  Toileting Assistance with Device: Minimal (anticipate)  Activity Tolerance:  Endurance: Tolerates 10 - 20 min exercise with multiple rests  Early Mobility/Exercise Safety Screen: Proceed with mobilization - No exclusion criteria met    Bed Mobility/Transfers: Bed Mobility/Transfers: Bed Mobility  Bed Mobility: Yes  Bed Mobility 1  Bed Mobility 1: Supine to sitting, Log roll  Level of Assistance 1: Minimum assistance  Bed Mobility Comments 1: cues for log roll sequencing, assist with trunk   and Transfers  Transfer: Yes  Transfer 1  Transfer From 1: Bed to  Transfer to 1: Stand  Technique 1: Sit to stand  Transfer Device 1: Walker  Transfer Level of Assistance 1: Contact guard  Trials/Comments 1: cues for hand placement  Transfers 2  Transfer From 2: Bed to  Transfer to 2: Chair with arms  Transfer Device 2: Walker  Transfer Level of Assistance 2: Contact guard  Transfers 3  Transfer From 3: Stand to  Transfer to 3: Chair with arms  Technique 3: Stand to sit  Transfer Device 3: Walker  Transfer Level of Assistance 3: Contact guard       Vision: Vision - Basic Assessment  Current Vision: Wears contacts   and    Sensation:  Light Touch: No apparent deficits  Strength:  Strength Comments: BUE >/= 3+/5, BLE >/= 3+/5 observed functionally  Perception:     Coordination:  Movements are Fluid and Coordinated: Yes  Finger to Target: Intact   Hand Function:  Hand Function  Gross Grasp: Functional  Extremities:   RUE   RUE : Within Functional Limits (AROM), LUE   LUE: Within Functional Limits (AROM), RLE   RLE : Within Functional Limits (AROM), and LLE   LLE : Within  Functional Limits (AROM)    Treatment Completed on Evaluation  Activities of Daily Living:    LE Dressing  LE Dressing: Yes  Pants Level of Assistance: Moderate assistance  LE Dressing Where Assessed: Edge of bed  LE Dressing Comments: Mod A for donning pants, unable to bring legs to figure 4 position     Therapy/Activity:     Therapeutic Activity  Therapeutic Activity Performed: Yes  Therapeutic Activity 1: Sitting EOB x10 minutes with SBA. Cues for proper alignment          Outcome Measures: [facility] Daily Activity  Putting on and taking off regular lower body clothing: A lot  Bathing (including washing, rinsing, drying): A little  Putting on and taking off regular upper body clothing: A little  Toileting, which includes using toilet, bedpan or urinal: A lot  Taking care of personal grooming such as brushing teeth: None  Eating Meals: None  Daily Activity - Total Score: 18    Confusion Assessment Method-ICU (CAM-ICU)  Feature 1: Acute Onset or Fluctuating Course: Negative  Overall CAM-ICU: Negative   ICU Mobility Screen  Early Mobility/Exercise Safety Screen: Proceed with mobilization - No exclusion criteria met,          Education Documentation  No documentation found.  Education Comments  No comments found.        Goals:   Encounter Problems       Encounter Problems (Active)       ADLs       Patient with complete lower body dressing with minimal assist  level of assistance donning and doffing all LE clothes  with PRN adaptive equipment while edge of bed  (Progressing)       Start:  04/13/24    Expected End:  05/04/24            Patient will complete toileting including hygiene clothing management/hygiene with contact guard assist level of assistance and grab bars. (Progressing)       Start:  04/13/24    Expected End:  05/04/24               BALANCE       Pt will maintain dynamic standing balance during ADL task with stand by assist level of assistance in order to demonstrate decreased risk of falling and improved  postural control. (Progressing)       Start:  04/13/24    Expected End:  05/04/24               COGNITION/SAFETY       Patient will recall and adhere to spine precautions during all functional mobility/ADL tasks in order to demonstrate improved understanding and promote healing post op (Progressing)       Start:  04/13/24    Expected End:  05/04/24               TRANSFERS       Patient will complete all functional transfers with least restrictive device with stand by assist level of assistance. (Progressing)       Start:  04/13/24    Expected End:  05/04/24 04/13/24 at 1:08 PM   Natacha Dawson OT   Rehab Office: [phone]

## 2024-04-13 NOTE — PROGRESS NOTES
"Alysa Mcnair is a 73 y.o. female on day 1 of admission presenting with Idiopathic scoliosis in adult patient.    Subjective   No acute events overnight, pain well controlled     Objective     Physical Exam  Aox3  FC x4 5/5 throughout   SILT       Last Recorded Vitals  Blood pressure 108/55, pulse 83, temperature 36.2 °C (97.2 °F), temperature source Temporal, resp. rate 13, height 1.6 m (5' 2.99\"), weight 61.4 kg (135 lb 5.8 oz), SpO2 100%.  Intake/Output last 3 Shifts:  I/O last 3 completed shifts:  In: 6510 (106 mL/kg) [I.V.:5810 (94.6 mL/kg); Blood:700]  Out: 4480 (73 mL/kg) [Urine:2310 (1 mL/kg/hr); Drains:570; Blood:1600]  Weight: 61.4 kg     Relevant Results                This patient has a urinary catheter   Reason for the urinary catheter remaining today? Urine catheter unnecessary, will be removed today               Assessment/Plan   Principal Problem:    Idiopathic scoliosis in adult patient  Active Problems:    Lumbar foraminal stenosis    Lumbar radiculopathy, chronic    Scoliosis of thoracolumbar region due to degenerative disease of spine in adult    Notalgia    73F h/o GERD, HLD, fibromyalgia, HTN, severe postop nausea, p/w progressive LBP, RLE radiculopathy, worsening coronal scoliosis, 4/12 s/p T10  - pelvis instrumentation, L1-S1 PCOs, L4/5, L5/S1 TLIF    Plan  - NSU, downgrade to tele  - maintain drains  - dc PCA/kingsley  - q6 labs  - EOS when able  - SCDs, SQH  - PTOT           Pari Gandhi MD      "

## 2024-04-13 NOTE — PROGRESS NOTES
Physical Therapy    Physical Therapy Evaluation & Treatment    Patient Name: Alysa Mcnair  MRN: [id number]  Today's Date: 4/13/2024   Time Calculation  Start Time: 1027  Stop Time: 1056  Time Calculation (min): 29 min    Assessment/Plan   PT Assessment  PT Assessment Results: Decreased strength, Decreased endurance, Impaired balance, Decreased mobility  Rehab Prognosis: Good  Evaluation/Treatment Tolerance: Patient tolerated treatment well  Medical Staff Made Aware: Yes  End of Session Communication: Bedside nurse  Assessment Comment: Pt presents to physical therapy following admission for spinal surgery. Pt participated in bed to chair transfer and seated exercises. Pt would benefit from continued skilled physical therapy to improve overall functional mobility.  End of Session Patient Position: Up in chair, Alarm off, caregiver present   IP OR SWING BED PT PLAN  Inpatient or Swing Bed: Inpatient  PT Plan  Treatment/Interventions: Bed mobility, Transfer training, Gait training, Stair training, Balance training, Neuromuscular re-education, Strengthening, Therapeutic activity, Therapeutic exercise, Endurance training, Home exercise program, Positioning, Postural re-education  PT Plan: Skilled PT  PT Frequency: Daily  PT Discharge Recommendations: Low intensity level of continued care  Equipment Recommended upon Discharge: Wheeled walker  PT Recommended Transfer Status: Assist x1  PT - OK to Discharge: Yes (Once medically cleared)      Subjective     General Visit Information:  General  Reason for Referral: p/w progressive LBP, RLE radiculopathy, worsening coronal scoliosis, 4/12 s/p T10  - pelvis instrumentation, L1-S1 PCOs, L4/5, L5/S1 TLIF  Past Medical History Relevant to Rehab: GERD, HLD, fibromyalgia, HTN  Family/Caregiver Present: Yes  Caregiver Feedback: spouse Jasson present and supportive  Co-Treatment: OT  Co-Treatment Reason: to maximize therapeutic activity and patient safety  Prior to Session  Communication: Bedside nurse  Patient Position Received: Bed, 3 rail up, Alarm off, not on at start of session  Preferred Learning Style: auditory, verbal, visual  General Comment: Pt supine in bed upon entry to room. pt pleasant and willing to work with therapy. Pt reports dizziness with position changes, extended time for transitional movements 2/2 safety.  Home Living:  Home Living  Type of Home: House  Lives With: Spouse  Home Adaptive Equipment: Walker rolling or standard, Reacher  Home Layout: Two level, Laundry in basement, Able to live on main level with bedroom/bathroom, Full bath main level  Home Access: Stairs to enter with rails  Entrance Stairs-Rails: None  Entrance Stairs-Number of Steps: 1  Bathroom Shower/Tub: Tub/shower unit  Bathroom Toilet: Standard  Bathroom Equipment: Grab bars in shower, Shower chair without back  Prior Level of Function:  Prior Function Per Pt/Caregiver Report  Level of [place]: Independent with ADLs and functional transfers, Independent with homemaking with ambulation  Receives Help From:  (spouse and family can assist as needed.)  ADL Assistance: Independent  Homemaking Assistance: Independent  Ambulatory Assistance: Independent  Vocational: Full time employment  Leisure: active, family has a farm  Hand Dominance: Right  Prior Function Comments: no falls, drives  Precautions:  Precautions  Medical Precautions: Fall precautions  Post-Surgical Precautions: Spinal precautions  Vital Signs:  Vital Signs  Heart Rate:  (Pre: =86 post: 87)  SpO2:  (Pre: 99 post: 97)  BP:  (pre: 92/74 (81) post: 94/62 (72))    Objective   Pain:  Pain Assessment  Pain Assessment: 0-10  Pain Score: 0 - No pain  Cognition:  Cognition  Overall Cognitive Status: Within Functional Limits  Orientation Level: Oriented X4    General Assessments:                  Activity Tolerance  Endurance: Tolerates 10 - 20 min exercise with multiple rests    Sensation  Light Touch: No apparent  deficits    Strength  Strength Comments: BLE grossly >3/5       Static Sitting Balance  Static Sitting-Comment/Number of Minutes: CGA  Dynamic Sitting Balance  Dynamic Sitting-Comments: CGA-min A    Static Standing Balance  Static Standing-Comment/Number of Minutes: CGA  Dynamic Standing Balance  Dynamic Standing-Comments: CGA  Functional Assessments:  Bed Mobility  Bed Mobility: Yes  Bed Mobility 1  Bed Mobility 1: Supine to sitting, Log roll  Level of Assistance 1: Minimum assistance  Bed Mobility Comments 1: Performed with HOB elevated. Log roll technique performed.    Transfers  Transfer: Yes  Transfer 1  Transfer From 1: Sit to, Stand to  Transfer to 1: Stand, Sit  Technique 1: Sit to stand, Stand to sit  Transfer Device 1: Walker  Transfer Level of Assistance 1: Contact guard  Trials/Comments 1: pt given cues for hand placement  Transfers 2  Transfer From 2: Bed to  Transfer to 2: Chair with arms  Transfer Device 2: Walker  Transfer Level of Assistance 2: Contact guard    Ambulation/Gait Training  Ambulation/Gait Training Performed: Yes  Ambulation/Gait Training 1  Surface 1: Level tile  Device 1: Rolling walker  Assistance 1: Contact guard  Quality of Gait 1:  (decreased step length, decreased tori)  Comments/Distance (ft) 1: Pt ambulated 3 feet to bedside chair.    Treatments:  Therapeutic Exercise  Therapeutic Exercise Performed: Yes  Therapeutic Exercise Activity 1: Pt performed ankle [id number], LAQx10 and seated [id number] bilaterally  Outcome Measures:  [facility] Basic Mobility  Turning from your back to your side while in a flat bed without using bedrails: A little  Moving from lying on your back to sitting on the side of a flat bed without using bedrails: A little  Moving to and from bed to chair (including a wheelchair): A little  Standing up from a chair using your arms (e.g. wheelchair or bedside chair): A little  To walk in hospital room: A little  Climbing 3-5 steps with railing: A little  Basic  Mobility - Total Score: 18    Encounter Problems       Encounter Problems (Active)       PT Problem       Pt will perform supine to sit transfer independently  (Progressing)       Start:  04/13/24    Expected End:  04/27/24            Pt will perform sit to stand transfer with mod I and LRAD  (Progressing)       Start:  04/13/24    Expected End:  04/27/24            Pt will ambulate 500 feet mod I with LRAD  (Progressing)       Start:  04/13/24    Expected End:  04/27/24            Pt will ascend/descend 10 steps Independently  (Progressing)       Start:  04/13/24    Expected End:  04/27/24                   Education Documentation  Precautions, taught by Abimbola Shaikh, PT at 4/13/2024  2:06 PM.  Learner: Patient  Readiness: Acceptance  Method: Explanation  Response: Verbalizes Understanding    Body Mechanics, taught by Abimbola Shaikh, PT at 4/13/2024  2:06 PM.  Learner: Patient  Readiness: Acceptance  Method: Explanation  Response: Verbalizes Understanding    Mobility Training, taught by Abimbola Shaikh, PT at 4/13/2024  2:06 PM.  Learner: Patient  Readiness: Acceptance  Method: Explanation  Response: Verbalizes Understanding    Education Comments  No comments found.

## 2024-04-13 NOTE — PROGRESS NOTES
Acute Pain Service    Postop Pain HPI -   Palliative: relieved with IV analgesics and regional local anesthetics  Provocative: movement  Quality:  burning and aching  Radiation:  none  Severity:  0/10  Timing: constant    24-HOUR OPIOID CONSUMPTION:  Tylenol 650 x2, toradol x3, dilaudid PCA    Scheduled medications  acetaminophen, 650 mg, oral, q6h  atorvastatin, 10 mg, oral, Daily  cyclobenzaprine, 5 mg, oral, TID  heparin (porcine), 5,000 Units, subcutaneous, q8h  ketamine, 0.2 mg/kg, intravenous, Once  ketamine, 0.2 mg/kg, intravenous, Once  ketorolac, 15 mg, intravenous, q6h  lidocaine, 2 patch, transdermal, Daily  pantoprazole, 40 mg, oral, Daily  polyethylene glycol, 17 g, oral, BID      Continuous medications  ketamine, 0.05-2 mg/kg/hr, Last Rate: Stopped (04/12/24 1338)  lactated Ringer's, 100 mL/hr, Last Rate: Stopped (04/13/24 0930)      PRN medications  PRN medications: bisacodyl, dextrose, dextrose, glucagon, glucagon, HYDROmorphone, naloxone, ondansetron **OR** ondansetron, oxyCODONE, oxyCODONE     Physical Exam:  Constitutional:  no distress, alert and cooperative  Eyes: clear sclera  Head/Neck: No apparent injury, trachea midline  Respiratory/Thorax: Patent airways, thorax symmetric, breathing comfortably  Cardiovascular: no pitting edema  Gastrointestinal: Nondistended  Musculoskeletal: ROM intact  Extremities: no clubbing  Neurological: alert, rivera x4  Psychological: Appropriate affect    Results for orders placed or performed during the hospital encounter of 04/12/24 (from the past 24 hour(s))   BLOOD GAS ARTERIAL FULL PANEL   Result Value Ref Range    POCT pH, Arterial 7.39 7.38 - 7.42 pH    POCT pCO2, Arterial 38 38 - 42 mm Hg    POCT pO2, Arterial 180 (H) 85 - 95 mm Hg    POCT SO2, Arterial 100 94 - 100 %    POCT Oxy Hemoglobin, Arterial 97.7 94.0 - 98.0 %    POCT Hematocrit Calculated, Arterial 28.0 (L) 36.0 - 46.0 %    POCT Sodium, Arterial 137 136 - 145 mmol/L    POCT Potassium, Arterial 3.7  3.5 - 5.3 mmol/L    POCT Chloride, Arterial 106 98 - 107 mmol/L    POCT Ionized Calcium, Arterial 1.05 (L) 1.10 - 1.33 mmol/L    POCT Glucose, Arterial 107 (H) 74 - 99 mg/dL    POCT Lactate, Arterial 4.1 (HH) 0.4 - 2.0 mmol/L    POCT Base Excess, Arterial -1.7 -2.0 - 3.0 mmol/L    POCT HCO3 Calculated, Arterial 23.0 22.0 - 26.0 mmol/L    POCT Hemoglobin, Arterial 9.3 (L) 12.0 - 16.0 g/dL    POCT Anion Gap, Arterial 12 10 - 25 mmo/L    Patient Temperature 37.0 degrees Celsius    FiO2 40 %   BLOOD GAS ARTERIAL FULL PANEL   Result Value Ref Range    POCT pH, Arterial 7.35 (L) 7.38 - 7.42 pH    POCT pCO2, Arterial 40 38 - 42 mm Hg    POCT pO2, Arterial 162 (H) 85 - 95 mm Hg    POCT SO2, Arterial 100 94 - 100 %    POCT Oxy Hemoglobin, Arterial 97.4 94.0 - 98.0 %    POCT Hematocrit Calculated, Arterial 20.0 (L) 36.0 - 46.0 %    POCT Sodium, Arterial 143 136 - 145 mmol/L    POCT Potassium, Arterial 3.7 3.5 - 5.3 mmol/L    POCT Chloride, Arterial 112 (H) 98 - 107 mmol/L    POCT Ionized Calcium, Arterial 1.01 (L) 1.10 - 1.33 mmol/L    POCT Glucose, Arterial 130 (H) 74 - 99 mg/dL    POCT Lactate, Arterial 2.3 (H) 0.4 - 2.0 mmol/L    POCT Base Excess, Arterial -3.2 (L) -2.0 - 3.0 mmol/L    POCT HCO3 Calculated, Arterial 22.1 22.0 - 26.0 mmol/L    POCT Hemoglobin, Arterial 6.5 (LL) 12.0 - 16.0 g/dL    POCT Anion Gap, Arterial 13 10 - 25 mmo/L    Patient Temperature 37.0 degrees Celsius    FiO2 40 %   CBC and Auto Differential   Result Value Ref Range    WBC 9.6 4.4 - 11.3 x10*3/uL    nRBC 0.0 0.0 - 0.0 /100 WBCs    RBC 3.75 (L) 4.00 - 5.20 x10*6/uL    Hemoglobin 11.5 (L) 12.0 - 16.0 g/dL    Hematocrit 33.4 (L) 36.0 - 46.0 %    MCV 89 80 - 100 fL    MCH 30.7 26.0 - 34.0 pg    MCHC 34.4 32.0 - 36.0 g/dL    RDW 13.7 11.5 - 14.5 %    Platelets 179 150 - 450 x10*3/uL    Neutrophils % 88.8 40.0 - 80.0 %    Immature Granulocytes %, Automated 2.3 (H) 0.0 - 0.9 %    Lymphocytes % 6.5 13.0 - 44.0 %    Monocytes % 2.1 2.0 - 10.0 %     Eosinophils % 0.0 0.0 - 6.0 %    Basophils % 0.3 0.0 - 2.0 %    Neutrophils Absolute 8.52 (H) 1.60 - 5.50 x10*3/uL    Immature Granulocytes Absolute, Automated 0.22 0.00 - 0.50 x10*3/uL    Lymphocytes Absolute 0.62 (L) 0.80 - 3.00 x10*3/uL    Monocytes Absolute 0.20 0.05 - 0.80 x10*3/uL    Eosinophils Absolute 0.00 0.00 - 0.40 x10*3/uL    Basophils Absolute 0.03 0.00 - 0.10 x10*3/uL   Renal function panel   Result Value Ref Range    Glucose 158 (H) 74 - 99 mg/dL    Sodium 143 136 - 145 mmol/L    Potassium 4.3 3.5 - 5.3 mmol/L    Chloride 110 (H) 98 - 107 mmol/L    Bicarbonate 27 21 - 32 mmol/L    Anion Gap 10 10 - 20 mmol/L    Urea Nitrogen 14 6 - 23 mg/dL    Creatinine 0.50 0.50 - 1.05 mg/dL    eGFR >90 >60 mL/min/1.73m*2    Calcium 8.8 8.6 - 10.6 mg/dL    Phosphorus 3.6 2.5 - 4.9 mg/dL    Albumin 3.4 3.4 - 5.0 g/dL   CBC   Result Value Ref Range    WBC 16.7 (H) 4.4 - 11.3 x10*3/uL    nRBC 0.0 0.0 - 0.0 /100 WBCs    RBC 3.84 (L) 4.00 - 5.20 x10*6/uL    Hemoglobin 11.9 (L) 12.0 - 16.0 g/dL    Hematocrit 34.6 (L) 36.0 - 46.0 %    MCV 90 80 - 100 fL    MCH 31.0 26.0 - 34.0 pg    MCHC 34.4 32.0 - 36.0 g/dL    RDW 14.0 11.5 - 14.5 %    Platelets 202 150 - 450 x10*3/uL   Basic metabolic panel   Result Value Ref Range    Glucose 183 (H) 74 - 99 mg/dL    Sodium 142 136 - 145 mmol/L    Potassium 4.2 3.5 - 5.3 mmol/L    Chloride 110 (H) 98 - 107 mmol/L    Bicarbonate 24 21 - 32 mmol/L    Anion Gap 12 10 - 20 mmol/L    Urea Nitrogen 16 6 - 23 mg/dL    Creatinine 0.49 (L) 0.50 - 1.05 mg/dL    eGFR >90 >60 mL/min/1.73m*2    Calcium 8.5 (L) 8.6 - 10.6 mg/dL   CBC   Result Value Ref Range    WBC 15.2 (H) 4.4 - 11.3 x10*3/uL    nRBC 0.0 0.0 - 0.0 /100 WBCs    RBC 3.02 (L) 4.00 - 5.20 x10*6/uL    Hemoglobin 9.4 (L) 12.0 - 16.0 g/dL    Hematocrit 27.9 (L) 36.0 - 46.0 %    MCV 92 80 - 100 fL    MCH 31.1 26.0 - 34.0 pg    MCHC 33.7 32.0 - 36.0 g/dL    RDW 14.6 (H) 11.5 - 14.5 %    Platelets 156 150 - 450 x10*3/uL   Basic metabolic  panel   Result Value Ref Range    Glucose 145 (H) 74 - 99 mg/dL    Sodium 142 136 - 145 mmol/L    Potassium 4.0 3.5 - 5.3 mmol/L    Chloride 110 (H) 98 - 107 mmol/L    Bicarbonate 27 21 - 32 mmol/L    Anion Gap 9 (L) 10 - 20 mmol/L    Urea Nitrogen 19 6 - 23 mg/dL    Creatinine 0.54 0.50 - 1.05 mg/dL    eGFR >90 >60 mL/min/1.73m*2    Calcium 7.9 (L) 8.6 - 10.6 mg/dL   CBC   Result Value Ref Range    WBC 12.8 (H) 4.4 - 11.3 x10*3/uL    nRBC 0.0 0.0 - 0.0 /100 WBCs    RBC 2.59 (L) 4.00 - 5.20 x10*6/uL    Hemoglobin 8.0 (L) 12.0 - 16.0 g/dL    Hematocrit 24.7 (L) 36.0 - 46.0 %    MCV 95 80 - 100 fL    MCH 30.9 26.0 - 34.0 pg    MCHC 32.4 32.0 - 36.0 g/dL    RDW 14.7 (H) 11.5 - 14.5 %    Platelets 150 150 - 450 x10*3/uL   Basic metabolic panel   Result Value Ref Range    Glucose 110 (H) 74 - 99 mg/dL    Sodium 141 136 - 145 mmol/L    Potassium 4.2 3.5 - 5.3 mmol/L    Chloride 108 (H) 98 - 107 mmol/L    Bicarbonate 23 21 - 32 mmol/L    Anion Gap 14 10 - 20 mmol/L    Urea Nitrogen 18 6 - 23 mg/dL    Creatinine 0.53 0.50 - 1.05 mg/dL    eGFR >90 >60 mL/min/1.73m*2    Calcium 7.5 (L) 8.6 - 10.6 mg/dL   Prepare RBC: 1 Units   Result Value Ref Range    PRODUCT CODE A2326J66     Unit Number [id number]     Unit ABO A     Unit RH POS     XM INTEP COMP     Dispense Status IS     Blood Expiration Date April 14, 2024 23:59 EDT     PRODUCT BLOOD TYPE 6200     UNIT VOLUME 350      Ashley Mcnair is a 73 y.o. year old female patient who presents for open Laminectomy and facetectomy for decompression with Dr. Davis. Acute Pain consulted for block for postoperative pain control.      Plan:  - Bialteral erector spinae single shot blocks performed preoperatively on 04/12/24  - Pain medications per primary team  - Will sign off at this time     Acute Pain Team  pg [phone] ph [phone].

## 2024-04-14 LAB
ALBUMIN SERPL BCP-MCNC: 3.4 G/DL (ref 3.4–5)
ANION GAP SERPL CALC-SCNC: 11 MMOL/L (ref 10–20)
BUN SERPL-MCNC: 22 MG/DL (ref 6–23)
CALCIUM SERPL-MCNC: 8.1 MG/DL (ref 8.6–10.6)
CHLORIDE SERPL-SCNC: 107 MMOL/L (ref 98–107)
CO2 SERPL-SCNC: 28 MMOL/L (ref 21–32)
CREAT SERPL-MCNC: 0.54 MG/DL (ref 0.5–1.05)
EGFRCR SERPLBLD CKD-EPI 2021: >90 ML/MIN/1.73M*2
ERYTHROCYTE [DISTWIDTH] IN BLOOD BY AUTOMATED COUNT: 14.8 % (ref 11.5–14.5)
FIBRINOGEN PPP-MCNC: 340 MG/DL (ref 200–400)
GLUCOSE SERPL-MCNC: 103 MG/DL (ref 74–99)
HCT VFR BLD AUTO: 29.7 % (ref 36–46)
HGB BLD-MCNC: 9.6 G/DL (ref 12–16)
MCH RBC QN AUTO: 30.3 PG (ref 26–34)
MCHC RBC AUTO-ENTMCNC: 32.3 G/DL (ref 32–36)
MCV RBC AUTO: 94 FL (ref 80–100)
NRBC BLD-RTO: 0 /100 WBCS (ref 0–0)
PHOSPHATE SERPL-MCNC: 1.6 MG/DL (ref 2.5–4.9)
PLATELET # BLD AUTO: 176 X10*3/UL (ref 150–450)
POTASSIUM SERPL-SCNC: 3.9 MMOL/L (ref 3.5–5.3)
RBC # BLD AUTO: 3.17 X10*6/UL (ref 4–5.2)
SODIUM SERPL-SCNC: 142 MMOL/L (ref 136–145)
WBC # BLD AUTO: 12.9 X10*3/UL (ref 4.4–11.3)

## 2024-04-14 PROCEDURE — 2500000004 HC RX 250 GENERAL PHARMACY W/ HCPCS (ALT 636 FOR OP/ED): Performed by: STUDENT IN AN ORGANIZED HEALTH CARE EDUCATION/TRAINING PROGRAM

## 2024-04-14 PROCEDURE — 97110 THERAPEUTIC EXERCISES: CPT | Mod: GP

## 2024-04-14 PROCEDURE — 2500000001 HC RX 250 WO HCPCS SELF ADMINISTERED DRUGS (ALT 637 FOR MEDICARE OP): Performed by: STUDENT IN AN ORGANIZED HEALTH CARE EDUCATION/TRAINING PROGRAM

## 2024-04-14 PROCEDURE — 85384 FIBRINOGEN ACTIVITY: CPT

## 2024-04-14 PROCEDURE — 36415 COLL VENOUS BLD VENIPUNCTURE: CPT

## 2024-04-14 PROCEDURE — 2500000005 HC RX 250 GENERAL PHARMACY W/O HCPCS: Performed by: STUDENT IN AN ORGANIZED HEALTH CARE EDUCATION/TRAINING PROGRAM

## 2024-04-14 PROCEDURE — 82565 ASSAY OF CREATININE: CPT

## 2024-04-14 PROCEDURE — 1100000001 HC PRIVATE ROOM DAILY

## 2024-04-14 PROCEDURE — 97116 GAIT TRAINING THERAPY: CPT | Mod: GP

## 2024-04-14 PROCEDURE — 85027 COMPLETE CBC AUTOMATED: CPT

## 2024-04-14 PROCEDURE — 51701 INSERT BLADDER CATHETER: CPT

## 2024-04-14 RX ORDER — OXYCODONE HYDROCHLORIDE 5 MG/1
5 TABLET ORAL EVERY 6 HOURS PRN
Status: DISCONTINUED | OUTPATIENT
Start: 2024-04-14 | End: 2024-04-14

## 2024-04-14 RX ORDER — HYDROCODONE BITARTRATE AND ACETAMINOPHEN 5; 325 MG/1; MG/1
1 TABLET ORAL EVERY 6 HOURS PRN
Status: DISCONTINUED | OUTPATIENT
Start: 2024-04-14 | End: 2024-04-15

## 2024-04-14 RX ORDER — HYDROCODONE BITARTRATE AND ACETAMINOPHEN 10; 325 MG/1; MG/1
1 TABLET ORAL EVERY 6 HOURS PRN
Status: DISCONTINUED | OUTPATIENT
Start: 2024-04-14 | End: 2024-04-15

## 2024-04-14 RX ORDER — OXYCODONE HYDROCHLORIDE 5 MG/1
10 TABLET ORAL EVERY 6 HOURS PRN
Status: DISCONTINUED | OUTPATIENT
Start: 2024-04-14 | End: 2024-04-14

## 2024-04-14 RX ADMIN — CYCLOBENZAPRINE 5 MG: 10 TABLET, FILM COATED ORAL at 09:00

## 2024-04-14 RX ADMIN — POLYETHYLENE GLYCOL 3350 17 G: 17 POWDER, FOR SOLUTION ORAL at 09:00

## 2024-04-14 RX ADMIN — HYDROCODONE BITARTRATE AND ACETAMINOPHEN 1 TABLET: 10; 325 TABLET ORAL at 14:33

## 2024-04-14 RX ADMIN — OXYCODONE HYDROCHLORIDE 5 MG: 5 TABLET ORAL at 04:09

## 2024-04-14 RX ADMIN — ATORVASTATIN CALCIUM 10 MG: 10 TABLET, FILM COATED ORAL at 09:00

## 2024-04-14 RX ADMIN — PANTOPRAZOLE SODIUM 40 MG: 40 TABLET, DELAYED RELEASE ORAL at 04:11

## 2024-04-14 RX ADMIN — HYDROCODONE BITARTRATE AND ACETAMINOPHEN 1 TABLET: 5; 325 TABLET ORAL at 20:51

## 2024-04-14 RX ADMIN — POLYETHYLENE GLYCOL 3350 17 G: 17 POWDER, FOR SOLUTION ORAL at 20:52

## 2024-04-14 RX ADMIN — ONDANSETRON 4 MG: 2 INJECTION INTRAMUSCULAR; INTRAVENOUS at 04:20

## 2024-04-14 RX ADMIN — HYDROCODONE BITARTRATE AND ACETAMINOPHEN 1 TABLET: 5; 325 TABLET ORAL at 09:01

## 2024-04-14 RX ADMIN — HEPARIN SODIUM 5000 UNITS: 5000 INJECTION INTRAVENOUS; SUBCUTANEOUS at 20:53

## 2024-04-14 RX ADMIN — HEPARIN SODIUM 5000 UNITS: 5000 INJECTION INTRAVENOUS; SUBCUTANEOUS at 04:08

## 2024-04-14 RX ADMIN — CYCLOBENZAPRINE 5 MG: 10 TABLET, FILM COATED ORAL at 14:34

## 2024-04-14 RX ADMIN — LIDOCAINE 2 PATCH: 4 PATCH TOPICAL at 10:00

## 2024-04-14 RX ADMIN — ACETAMINOPHEN 650 MG: 325 TABLET ORAL at 04:07

## 2024-04-14 RX ADMIN — HEPARIN SODIUM 5000 UNITS: 5000 INJECTION INTRAVENOUS; SUBCUTANEOUS at 12:44

## 2024-04-14 RX ADMIN — CYCLOBENZAPRINE 5 MG: 10 TABLET, FILM COATED ORAL at 20:52

## 2024-04-14 RX ADMIN — BISACODYL 10 MG: 5 TABLET, COATED ORAL at 04:09

## 2024-04-14 RX ADMIN — ONDANSETRON 4 MG: 2 INJECTION INTRAMUSCULAR; INTRAVENOUS at 20:40

## 2024-04-14 ASSESSMENT — COGNITIVE AND FUNCTIONAL STATUS - GENERAL
MOVING TO AND FROM BED TO CHAIR: A LITTLE
DRESSING REGULAR LOWER BODY CLOTHING: A LOT
WALKING IN HOSPITAL ROOM: A LITTLE
MOVING TO AND FROM BED TO CHAIR: A LITTLE
CLIMB 3 TO 5 STEPS WITH RAILING: A LOT
TOILETING: A LITTLE
STANDING UP FROM CHAIR USING ARMS: A LITTLE
WALKING IN HOSPITAL ROOM: A LITTLE
MOVING FROM LYING ON BACK TO SITTING ON SIDE OF FLAT BED WITH BEDRAILS: A LITTLE
STANDING UP FROM CHAIR USING ARMS: A LITTLE
CLIMB 3 TO 5 STEPS WITH RAILING: A LOT
DAILY ACTIVITIY SCORE: 19
MOBILITY SCORE: 17
TURNING FROM BACK TO SIDE WHILE IN FLAT BAD: A LITTLE
TURNING FROM BACK TO SIDE WHILE IN FLAT BAD: A LITTLE
DRESSING REGULAR UPPER BODY CLOTHING: A LITTLE
HELP NEEDED FOR BATHING: A LITTLE

## 2024-04-14 ASSESSMENT — PAIN SCALES - GENERAL
PAINLEVEL_OUTOF10: 7
PAINLEVEL_OUTOF10: 0 - NO PAIN

## 2024-04-14 ASSESSMENT — PAIN - FUNCTIONAL ASSESSMENT
PAIN_FUNCTIONAL_ASSESSMENT: 0-10
PAIN_FUNCTIONAL_ASSESSMENT: 0-10

## 2024-04-14 ASSESSMENT — PAIN DESCRIPTION - LOCATION: LOCATION: BACK

## 2024-04-14 ASSESSMENT — PAIN SCALES - PAIN ASSESSMENT IN ADVANCED DEMENTIA (PAINAD): BREATHING: NORMAL

## 2024-04-14 ASSESSMENT — PAIN SCALES - WONG BAKER: WONGBAKER_NUMERICALRESPONSE: NO HURT

## 2024-04-14 NOTE — PROGRESS NOTES
"Physical Therapy    Physical Therapy Treatment    Patient Name: Ashley Mcnair \"Alysa\"  MRN: [id number]  Today's Date: 4/14/2024  Time Calculation  Start Time: 1302  Stop Time: 1326  Time Calculation (min): 24 min       Assessment/Plan   PT Assessment  Evaluation/Treatment Tolerance: Patient tolerated treatment well, Patient limited by pain  End of Session Communication: Bedside nurse  Assessment Comment: Pt able to ambulate 25ft x 2 with CGA and WW steadily. Pt with increased pain standing, but once upright pain improved. Patient continues to benefit from skilled physical therapy to maximize functional mobility and safety. Pt remains appropriate for low intensity therapy when medically appropriate for DC.   End of Session Patient Position: Up in chair, Alarm off, not on at start of session (CB in reach, family present)     PT Plan  Treatment/Interventions: Bed mobility, Transfer training, Gait training, Stair training, Balance training, Neuromuscular re-education, Strengthening, Therapeutic activity, Therapeutic exercise, Endurance training, Home exercise program, Positioning, Postural re-education  PT Plan: Skilled PT  PT Frequency: Daily  PT Discharge Recommendations: Low intensity level of continued care  Equipment Recommended upon Discharge: Wheeled walker  PT Recommended Transfer Status: Assist x1  PT - OK to Discharge: Yes (Once medically cleared)  RN cleared prior to session    General Visit Information:   PT  Visit  PT Received On: 04/14/24  Response to Previous Treatment: Patient with no complaints from previous session.  General  Reason for Referral: p/w progressive LBP, RLE radiculopathy, worsening coronal scoliosis, 4/12 s/p T10  - pelvis instrumentation, L1-S1 PCOs, L4/5, L5/S1 TLIF  Past Medical History Relevant to Rehab: GERD, HLD, fibromyalgia, HTN  Family/Caregiver Present: Yes  Caregiver Feedback: spouse Jasson present and supportive; daughter present at end of session  Prior to Session " Communication: Bedside nurse  Patient Position Received: Up in chair, Alarm off, not on at start of session  Preferred Learning Style: auditory, verbal, visual  General Comment: Pt pleasant and agreebale to therapy    Subjective   Precautions:  Precautions  Medical Precautions: Fall precautions  Post-Surgical Precautions: Spinal precautions    Objective   Pain:  Pain Assessment  Pain Assessment: 0-10  Pain Score: 7  Pain Type: Acute pain  Pain Location: Back  Pain Orientation: Right, Mid  Pain Interventions: Cold applied, Repositioned  Response to Interventions: no change  Cognition:  Cognition  Overall Cognitive Status: Within Functional Limits  Arousal/Alertness: Appropriate responses to stimuli  Orientation Level: Oriented X4  Following Commands: Follows all commands and directions without difficulty  Activity Tolerance:  Activity Tolerance  Endurance: Tolerates 30+ min exercise without fatigue, Tolerates 30 min exercise with multiple rests  Treatments:  Therapeutic Exercise  Therapeutic Exercise Performed: Yes  Therapeutic Exercise Activity 1: seated active LAQ, AP, hip ADD  Therapeutic Exercise Activity 2: standing with CGA and WW - marches, hip abd, mini squats(min vc to maightain straight back and only bend at knees    Therapeutic Activity  Therapeutic Activity Performed: Yes  Therapeutic Activity 1: static standing with WW x 5 min with CGA    Bed Mobility  Bed Mobility: No (pt up in chair pre and post)    Ambulation/Gait Training  Ambulation/Gait Training Performed: Yes  Ambulation/Gait Training 1  Surface 1: Level tile  Device 1: Rolling walker  Assistance 1: Contact guard  Quality of Gait 1: Decreased step length (decreased tori)  Comments/Distance (ft) 1: 25 ft x 2; min vc for safety and sequencing  Transfers  Transfer: Yes  Transfer 1  Transfer From 1: Sit to, Stand to  Transfer to 1: Stand, Sit  Technique 1: Sit to stand, Stand to sit  Transfer Device 1: Walker  Transfer Level of Assistance 1:  Contact guard  Trials/Comments 1: min vc for hand placement and safety    Outcome Measures:  [facility] Basic Mobility  Turning from your back to your side while in a flat bed without using bedrails: A little  Moving from lying on your back to sitting on the side of a flat bed without using bedrails: A little  Moving to and from bed to chair (including a wheelchair): A little  Standing up from a chair using your arms (e.g. wheelchair or bedside chair): A little  To walk in hospital room: A little  Climbing 3-5 steps with railing: A lot  Basic Mobility - Total Score: 17    Education Documentation  Precautions, taught by Sierra Hale PT at 4/14/2024  3:13 PM.  Learner: Patient  Readiness: Acceptance  Method: Explanation, Demonstration  Response: Verbalizes Understanding, Needs Reinforcement    Body Mechanics, taught by Sierra Hale PT at 4/14/2024  3:13 PM.  Learner: Patient  Readiness: Acceptance  Method: Explanation, Demonstration  Response: Verbalizes Understanding, Needs Reinforcement    Mobility Training, taught by Sierra Hale PT at 4/14/2024  3:13 PM.  Learner: Patient  Readiness: Acceptance  Method: Explanation, Demonstration  Response: Verbalizes Understanding, Needs Reinforcement    Education Comments  No comments found.      Encounter Problems       Encounter Problems (Active)       PT Problem       Pt will perform supine to sit transfer independently  (Progressing)       Start:  04/13/24    Expected End:  04/27/24            Pt will perform sit to stand transfer with mod I and LRAD  (Progressing)       Start:  04/13/24    Expected End:  04/27/24            Pt will ambulate 500 feet mod I with LRAD  (Progressing)       Start:  04/13/24    Expected End:  04/27/24            Pt will ascend/descend 10 steps Independently  (Progressing)       Start:  04/13/24    Expected End:  04/27/24

## 2024-04-14 NOTE — PROGRESS NOTES
"Alysa Mcnair is a 73 y.o. female on day 2 of admission presenting with Idiopathic scoliosis in adult patient.  Met with patient/spouse to introduce myself,role and discuss discharge planning.  Patient lives with spouse of 53 years.  Patient required no assist devices for mobility..  Patient has ordered a walker, which will be delivered on Monday 4/15.  Denies active home care and is agreeable to home care if needed.  Family will transport patient home.  Patient expressed no questions and no social work needs.  PCP:  Gabriel Rios  Pharmacy:  [facility]  Home Care:  N/A  DME:  N/A      Physical Exam    Last Recorded Vitals  Blood pressure 107/67, pulse 86, temperature 36.7 °C (98.1 °F), resp. rate 18, height 1.6 m (5' 2.99\"), weight 61.4 kg (135 lb 5.8 oz), SpO2 92%.  Intake/Output last 3 Shifts:  I/O last 3 completed shifts:  In: 1773.3 (28.9 mL/kg) [I.V.:1550 (25.2 mL/kg); Blood:223.3]  Out: 2265 (36.9 mL/kg) [Urine:1505 (0.7 mL/kg/hr); Drains:760]  Weight: 61.4 kg           Assessment/Plan   Principal Problem:    Idiopathic scoliosis in adult patient  Active Problems:    Lumbar foraminal stenosis    Lumbar radiculopathy, chronic    Scoliosis of thoracolumbar region due to degenerative disease of spine in adult    Mari Ramos RN      "

## 2024-04-14 NOTE — PROGRESS NOTES
"Alysa Mcnair is a 73 y.o. female on day 2 of admission presenting with Idiopathic scoliosis in adult patient.    Subjective   Straight cathx1 overnight     Objective     Physical Exam  Aox3  FC x4 5/5 throughout   SILT       Last Recorded Vitals  Blood pressure 105/60, pulse 86, temperature 37.2 °C (99 °F), temperature source Temporal, resp. rate 17, height 1.6 m (5' 2.99\"), weight 61.4 kg (135 lb 5.8 oz), SpO2 93%.  Intake/Output last 3 Shifts:  I/O last 3 completed shifts:  In: 7083.3 (115.4 mL/kg) [I.V.:6160 (100.3 mL/kg); Blood:923.3]  Out: 5100 (83.1 mL/kg) [Urine:2640 (1.2 mL/kg/hr); Drains:860; Blood:1600]  Weight: 61.4 kg     Relevant Results                             Assessment/Plan   Principal Problem:    Idiopathic scoliosis in adult patient  Active Problems:    Lumbar foraminal stenosis    Lumbar radiculopathy, chronic    Scoliosis of thoracolumbar region due to degenerative disease of spine in adult    Notalgia    73F h/o GERD, HLD, fibromyalgia, HTN, severe postop nausea, p/w progressive LBP, RLE radiculopathy, worsening coronal scoliosis, 4/12 s/p T10  - pelvis instrumentation, L1-S1 PCOs, L4/5, L5/S1 TLIF  4/14 drain auto dc'd (with stitch), SCx1    Plan  - NSU, downgrade to tele  - Drain x1  - void trial  - q12 labs  - EOS when able  - SCDs, SQH  - PTOT - low intensity           Kristina Zacarias MD      "

## 2024-04-15 LAB
ALBUMIN SERPL BCP-MCNC: 3.2 G/DL (ref 3.4–5)
ANION GAP SERPL CALC-SCNC: 11 MMOL/L (ref 10–20)
BUN SERPL-MCNC: 12 MG/DL (ref 6–23)
CALCIUM SERPL-MCNC: 8.3 MG/DL (ref 8.6–10.6)
CHLORIDE SERPL-SCNC: 104 MMOL/L (ref 98–107)
CO2 SERPL-SCNC: 29 MMOL/L (ref 21–32)
CREAT SERPL-MCNC: 0.44 MG/DL (ref 0.5–1.05)
EGFRCR SERPLBLD CKD-EPI 2021: >90 ML/MIN/1.73M*2
ERYTHROCYTE [DISTWIDTH] IN BLOOD BY AUTOMATED COUNT: 14 % (ref 11.5–14.5)
GLUCOSE SERPL-MCNC: 133 MG/DL (ref 74–99)
HCT VFR BLD AUTO: 30.3 % (ref 36–46)
HGB BLD-MCNC: 9.5 G/DL (ref 12–16)
MCH RBC QN AUTO: 29.8 PG (ref 26–34)
MCHC RBC AUTO-ENTMCNC: 31.4 G/DL (ref 32–36)
MCV RBC AUTO: 95 FL (ref 80–100)
NRBC BLD-RTO: 0 /100 WBCS (ref 0–0)
PHOSPHATE SERPL-MCNC: 1.6 MG/DL (ref 2.5–4.9)
PLATELET # BLD AUTO: 173 X10*3/UL (ref 150–450)
POTASSIUM SERPL-SCNC: 3.8 MMOL/L (ref 3.5–5.3)
RBC # BLD AUTO: 3.19 X10*6/UL (ref 4–5.2)
SODIUM SERPL-SCNC: 140 MMOL/L (ref 136–145)
WBC # BLD AUTO: 11.3 X10*3/UL (ref 4.4–11.3)

## 2024-04-15 PROCEDURE — 2500000004 HC RX 250 GENERAL PHARMACY W/ HCPCS (ALT 636 FOR OP/ED): Performed by: STUDENT IN AN ORGANIZED HEALTH CARE EDUCATION/TRAINING PROGRAM

## 2024-04-15 PROCEDURE — 2500000001 HC RX 250 WO HCPCS SELF ADMINISTERED DRUGS (ALT 637 FOR MEDICARE OP): Performed by: STUDENT IN AN ORGANIZED HEALTH CARE EDUCATION/TRAINING PROGRAM

## 2024-04-15 PROCEDURE — 36415 COLL VENOUS BLD VENIPUNCTURE: CPT

## 2024-04-15 PROCEDURE — 2500000004 HC RX 250 GENERAL PHARMACY W/ HCPCS (ALT 636 FOR OP/ED): Performed by: PHYSICIAN ASSISTANT

## 2024-04-15 PROCEDURE — 84100 ASSAY OF PHOSPHORUS: CPT

## 2024-04-15 PROCEDURE — 2500000005 HC RX 250 GENERAL PHARMACY W/O HCPCS: Performed by: STUDENT IN AN ORGANIZED HEALTH CARE EDUCATION/TRAINING PROGRAM

## 2024-04-15 PROCEDURE — 97530 THERAPEUTIC ACTIVITIES: CPT | Mod: GP

## 2024-04-15 PROCEDURE — 2500000001 HC RX 250 WO HCPCS SELF ADMINISTERED DRUGS (ALT 637 FOR MEDICARE OP): Performed by: PHYSICIAN ASSISTANT

## 2024-04-15 PROCEDURE — 97116 GAIT TRAINING THERAPY: CPT | Mod: GP

## 2024-04-15 PROCEDURE — 85027 COMPLETE CBC AUTOMATED: CPT

## 2024-04-15 PROCEDURE — 1100000001 HC PRIVATE ROOM DAILY

## 2024-04-15 RX ORDER — LOSARTAN POTASSIUM 50 MG/1
50 TABLET ORAL DAILY
Status: DISCONTINUED | OUTPATIENT
Start: 2024-04-15 | End: 2024-04-15

## 2024-04-15 RX ORDER — LOSARTAN POTASSIUM 25 MG/1
25 TABLET ORAL DAILY
Status: DISCONTINUED | OUTPATIENT
Start: 2024-04-15 | End: 2024-04-16

## 2024-04-15 RX ORDER — HYDROCODONE BITARTRATE AND ACETAMINOPHEN 5; 325 MG/1; MG/1
2 TABLET ORAL EVERY 6 HOURS PRN
Status: DISCONTINUED | OUTPATIENT
Start: 2024-04-15 | End: 2024-04-16

## 2024-04-15 RX ORDER — HYDROCODONE BITARTRATE AND ACETAMINOPHEN 5; 300 MG/1; MG/1
1 TABLET ORAL EVERY 6 HOURS PRN
Status: DISCONTINUED | OUTPATIENT
Start: 2024-04-15 | End: 2024-04-15

## 2024-04-15 RX ORDER — HYDROCODONE BITARTRATE AND ACETAMINOPHEN 5; 325 MG/1; MG/1
1 TABLET ORAL EVERY 6 HOURS PRN
Qty: 20 TABLET | Refills: 0 | Status: CANCELLED | OUTPATIENT
Start: 2024-04-15 | End: 2024-04-20

## 2024-04-15 RX ORDER — HYDROCODONE BITARTRATE AND ACETAMINOPHEN 5; 325 MG/1; MG/1
1 TABLET ORAL EVERY 6 HOURS PRN
Status: DISCONTINUED | OUTPATIENT
Start: 2024-04-15 | End: 2024-04-16

## 2024-04-15 RX ORDER — AMOXICILLIN 250 MG
1 CAPSULE ORAL 2 TIMES DAILY
Status: DISCONTINUED | OUTPATIENT
Start: 2024-04-15 | End: 2024-04-17 | Stop reason: HOSPADM

## 2024-04-15 RX ADMIN — HEPARIN SODIUM 5000 UNITS: 5000 INJECTION INTRAVENOUS; SUBCUTANEOUS at 20:37

## 2024-04-15 RX ADMIN — CYCLOBENZAPRINE 5 MG: 10 TABLET, FILM COATED ORAL at 20:37

## 2024-04-15 RX ADMIN — POLYETHYLENE GLYCOL 3350 17 G: 17 POWDER, FOR SOLUTION ORAL at 20:37

## 2024-04-15 RX ADMIN — HYDROCODONE BITARTRATE AND ACETAMINOPHEN 1 TABLET: 5; 325 TABLET ORAL at 05:35

## 2024-04-15 RX ADMIN — LIDOCAINE 2 PATCH: 4 PATCH TOPICAL at 10:09

## 2024-04-15 RX ADMIN — HEPARIN SODIUM 5000 UNITS: 5000 INJECTION INTRAVENOUS; SUBCUTANEOUS at 12:33

## 2024-04-15 RX ADMIN — SENNOSIDES AND DOCUSATE SODIUM 1 TABLET: 8.6; 5 TABLET ORAL at 20:37

## 2024-04-15 RX ADMIN — SENNOSIDES AND DOCUSATE SODIUM 1 TABLET: 8.6; 5 TABLET ORAL at 10:09

## 2024-04-15 RX ADMIN — HEPARIN SODIUM 5000 UNITS: 5000 INJECTION INTRAVENOUS; SUBCUTANEOUS at 04:46

## 2024-04-15 RX ADMIN — ONDANSETRON 4 MG: 2 INJECTION INTRAMUSCULAR; INTRAVENOUS at 20:37

## 2024-04-15 RX ADMIN — ONDANSETRON 4 MG: 2 INJECTION INTRAMUSCULAR; INTRAVENOUS at 12:33

## 2024-04-15 RX ADMIN — SODIUM CHLORIDE 500 ML: 9 INJECTION, SOLUTION INTRAVENOUS at 20:56

## 2024-04-15 RX ADMIN — HYDROCODONE BITARTRATE AND ACETAMINOPHEN 1 TABLET: 5; 325 TABLET ORAL at 18:45

## 2024-04-15 RX ADMIN — PANTOPRAZOLE SODIUM 40 MG: 40 TABLET, DELAYED RELEASE ORAL at 08:18

## 2024-04-15 RX ADMIN — ONDANSETRON 4 MG: 2 INJECTION INTRAMUSCULAR; INTRAVENOUS at 04:46

## 2024-04-15 RX ADMIN — PROMETHAZINE HYDROCHLORIDE 12.5 MG: 25 INJECTION INTRAMUSCULAR; INTRAVENOUS at 16:20

## 2024-04-15 RX ADMIN — LOSARTAN POTASSIUM 25 MG: 25 TABLET, FILM COATED ORAL at 15:16

## 2024-04-15 RX ADMIN — ATORVASTATIN CALCIUM 10 MG: 10 TABLET, FILM COATED ORAL at 10:09

## 2024-04-15 ASSESSMENT — COGNITIVE AND FUNCTIONAL STATUS - GENERAL
WALKING IN HOSPITAL ROOM: A LITTLE
TURNING FROM BACK TO SIDE WHILE IN FLAT BAD: A LITTLE
MOBILITY SCORE: 18
MOVING TO AND FROM BED TO CHAIR: A LITTLE
TURNING FROM BACK TO SIDE WHILE IN FLAT BAD: A LITTLE
WALKING IN HOSPITAL ROOM: A LITTLE
MOVING FROM LYING ON BACK TO SITTING ON SIDE OF FLAT BED WITH BEDRAILS: A LITTLE
TOILETING: A LITTLE
CLIMB 3 TO 5 STEPS WITH RAILING: A LITTLE
MOVING TO AND FROM BED TO CHAIR: A LITTLE
STANDING UP FROM CHAIR USING ARMS: A LITTLE
DRESSING REGULAR UPPER BODY CLOTHING: A LITTLE
MOBILITY SCORE: 18
MOVING FROM LYING ON BACK TO SITTING ON SIDE OF FLAT BED WITH BEDRAILS: A LITTLE
CLIMB 3 TO 5 STEPS WITH RAILING: A LITTLE
STANDING UP FROM CHAIR USING ARMS: A LITTLE
DRESSING REGULAR LOWER BODY CLOTHING: A LOT
HELP NEEDED FOR BATHING: A LITTLE
DAILY ACTIVITIY SCORE: 19

## 2024-04-15 ASSESSMENT — PAIN - FUNCTIONAL ASSESSMENT
PAIN_FUNCTIONAL_ASSESSMENT: 0-10

## 2024-04-15 ASSESSMENT — PAIN DESCRIPTION - LOCATION
LOCATION: HEAD
LOCATION: BACK

## 2024-04-15 ASSESSMENT — PAIN SCALES - GENERAL
PAINLEVEL_OUTOF10: 0 - NO PAIN
PAINLEVEL_OUTOF10: 5 - MODERATE PAIN
PAINLEVEL_OUTOF10: 0 - NO PAIN
PAINLEVEL_OUTOF10: 6
PAINLEVEL_OUTOF10: 2
PAINLEVEL_OUTOF10: 0 - NO PAIN

## 2024-04-15 NOTE — PROGRESS NOTES
"Alysa Mcnair is a 73 y.o. female on day 3 of admission presenting with Idiopathic scoliosis in adult patient.    Subjective   Pain improved overnight     Objective     Physical Exam  Aox3  FC x4 5/5 throughout   SILT       Last Recorded Vitals  Blood pressure 153/79, pulse 107, temperature 36.5 °C (97.7 °F), temperature source Temporal, resp. rate 17, height 1.6 m (5' 2.99\"), weight 61.4 kg (135 lb 5.8 oz), SpO2 90%.  Intake/Output last 3 Shifts:  I/O last 3 completed shifts:  In: 573.3 (9.3 mL/kg) [I.V.:350 (5.7 mL/kg); Blood:223.3]  Out: 1940 (31.6 mL/kg) [Urine:1330 (0.6 mL/kg/hr); Drains:610]  Weight: 61.4 kg     Relevant Results                             Assessment/Plan   Principal Problem:    Idiopathic scoliosis in adult patient  Active Problems:    Lumbar foraminal stenosis    Lumbar radiculopathy, chronic    Scoliosis of thoracolumbar region due to degenerative disease of spine in adult    Notalgia    73F h/o GERD, HLD, fibromyalgia, HTN, severe postop nausea, p/w progressive LBP, RLE radiculopathy, worsening coronal scoliosis, 4/12 s/p T10  - pelvis instrumentation, L1-S1 PCOs, L4/5, L5/S1 TLIF  4/14 drain auto dc'd (with stitch), SCx1    Plan  - Floor  - Drain x1 - off suction today  - encourage BM  - q12 labs  - EOS today  - SCDs, SQH  - PTOT - low intensity           Kristina Zacarias MD      "

## 2024-04-15 NOTE — CARE PLAN
Problem: Skin  Goal: Decreased wound size/increased tissue granulation at next dressing change  Outcome: Progressing     Problem: Pain  Goal: Takes deep breaths with improved pain control throughout the shift  Outcome: Progressing     Problem: Skin  Goal: Decreased wound size/increased tissue granulation at next dressing change  Outcome: Progressing  Goal: Participates in plan/prevention/treatment measures  Outcome: Progressing  Goal: Prevent/manage excess moisture  Outcome: Progressing  Goal: Prevent/minimize sheer/friction injuries  Outcome: Progressing  Goal: Promote/optimize nutrition  Outcome: Progressing  Goal: Promote skin healing  Outcome: Progressing     Problem: Pain  Goal: Takes deep breaths with improved pain control throughout the shift  Outcome: Progressing  Goal: Turns in bed with improved pain control throughout the shift  Outcome: Progressing  Goal: Walks with improved pain control throughout the shift  Outcome: Progressing  Goal: Performs ADL's with improved pain control throughout shift  Outcome: Progressing  Goal: Participates in PT with improved pain control throughout the shift  Outcome: Progressing  Goal: Free from opioid side effects throughout the shift  Outcome: Progressing  Goal: Free from acute confusion related to pain meds throughout the shift  Outcome: Progressing   The patient's goals for the shift include      The clinical goals for the shift include patient will sustain tolerable pain level of 2-3 throughout shift    Over the shift, the patient did not make progress toward the following goals. Barriers to progression include surgical wounds. Recommendations to address these barriers include following care plan. .

## 2024-04-15 NOTE — PROGRESS NOTES
Care Transitions Progress Note:  Plan per medical team: s/p spine surgery  Team Members Present: NP: MD: DAVONTE: CALE  Dispo: home [facility]  Status: inpatient  Payor: O ; Medicare  Barriers:  none  Adod: 1 day

## 2024-04-15 NOTE — PROGRESS NOTES
"Physical Therapy    Physical Therapy Treatment    Patient Name: Ashley Mcnair \"Alysa\"  MRN: [id number]  Today's Date: 4/15/2024  Time Calculation  Start Time: 1625  Stop Time: 1704  Time Calculation (min): 39 min       Assessment/Plan   PT Assessment  Evaluation/Treatment Tolerance: Patient tolerated treatment well  Medical Staff Made Aware: Yes  End of Session Patient Position: Bed, 3 rail up, Alarm off, not on at start of session, Alarm off, caregiver present     PT Plan  Treatment/Interventions: Bed mobility, Transfer training, Gait training, Stair training, Balance training, Neuromuscular re-education, Strengthening, Therapeutic activity, Therapeutic exercise, Endurance training, Home exercise program, Positioning, Postural re-education  PT Plan: Skilled PT  PT Frequency: Daily  PT Discharge Recommendations: Low intensity level of continued care  Equipment Recommended upon Discharge: Wheeled walker  PT Recommended Transfer Status: Assist x1  PT - OK to Discharge: Yes (Once medically cleared)    General Visit Information:   PT  Visit  PT Received On: 04/15/24  Response to Previous Treatment: Patient with no complaints from previous session.  General  Family/Caregiver Present: Yes  Caregiver Feedback:  and daughter present and supportive  Prior to Session Communication: Bedside nurse  Patient Position Received: Bed, 3 rail up, Alarm off, not on at start of session, Alarm off, caregiver present  General Comment: Supine.  Family present. Pt pleasant, cooperative, agreeable to PT.  Able to ambulate community distance today with FWW, and also navigate short flight of stairs.  Tolerated well.    Subjective   Precautions:  Precautions  Medical Precautions: Fall precautions  Post-Surgical Precautions: Spinal precautions    Objective   Pain:  Pain Assessment  Pain Assessment:  (Pain did not limit mobility)  Cognition:  Cognition  Overall Cognitive Status: Within Functional Limits  Arousal/Alertness: Appropriate " responses to stimuli  Orientation Level: Oriented X4  Following Commands: Follows all commands and directions without difficulty  Safety/Judgement: Within Functional Limits  Impulsive: Within functional limits    Activity Tolerance:  Activity Tolerance  Endurance: Tolerates 30+ min exercise without fatigue  Treatments:    Bed Mobility 1  Bed Mobility 1: Supine to sitting, Sitting to supine, Log roll  Level of Assistance 1: Minimum assistance, Minimal verbal cues, Minimal tactile cues    Ambulation/Gait Training 1  Surface 1: Level tile  Device 1: Rolling walker  Assistance 1: Close supervision, Minimal verbal cues  Quality of Gait 1: Decreased step length, Wide base of support  Comments/Distance (ft) 1: 15 feet, 50 feet, 200 feet  Transfer 1  Transfer From 1: Sit to, Stand to  Transfer to 1: Sit  Transfer Device 1: Walker  Transfer Level of Assistance 1: Close supervision, Minimal verbal cues  Transfers 2  Transfer From 2: Stand to, Toilet to  Transfer to 2: Stand  Transfer Device 2: Walker  Transfer Level of Assistance 2: Close supervision, Minimal verbal cues    Stairs  Stairs: Yes  Stairs  Rails 1: Bilateral  Device 1: Railing  Assistance 1: Contact guard, Minimal verbal cues  Comment/Number of Steps 1: up/dn 3, both feet same step    Outcome Measures:    [facility] Basic Mobility  Turning from your back to your side while in a flat bed without using bedrails: A little  Moving from lying on your back to sitting on the side of a flat bed without using bedrails: A little  Moving to and from bed to chair (including a wheelchair): A little  Standing up from a chair using your arms (e.g. wheelchair or bedside chair): A little  To walk in hospital room: A little  Climbing 3-5 steps with railing: A little  Basic Mobility - Total Score: 18    Education Documentation  Precautions, taught by Franko Moore, PT at 4/15/2024  5:42 PM.  Learner: Patient  Readiness: Eager  Method: Explanation  Response: Verbalizes  Understanding    Body Mechanics, taught by Franko Moore, PT at 4/15/2024  5:42 PM.  Learner: Patient  Readiness: Eager  Method: Explanation  Response: Verbalizes Understanding    Mobility Training, taught by Franko Moore, PT at 4/15/2024  5:42 PM.  Learner: Patient  Readiness: Eager  Method: Explanation  Response: Verbalizes Understanding    Education Comments  No comments found.        OP EDUCATION:       Encounter Problems       Encounter Problems (Active)       PT Problem       Pt will perform supine to sit transfer independently  (Progressing)       Start:  04/13/24    Expected End:  04/27/24            Pt will perform sit to stand transfer with mod I and LRAD  (Progressing)       Start:  04/13/24    Expected End:  04/27/24            Pt will ambulate 500 feet mod I with LRAD  (Progressing)       Start:  04/13/24    Expected End:  04/27/24            Pt will ascend/descend 10 steps Independently  (Progressing)       Start:  04/13/24    Expected End:  04/27/24

## 2024-04-16 ENCOUNTER — APPOINTMENT (OUTPATIENT)
Dept: RADIOLOGY | Facility: HOSPITAL | Age: 74
DRG: 454 | End: 2024-04-16
Payer: COMMERCIAL

## 2024-04-16 LAB
ALBUMIN SERPL BCP-MCNC: 3.4 G/DL (ref 3.4–5)
ANION GAP SERPL CALC-SCNC: 11 MMOL/L (ref 10–20)
BUN SERPL-MCNC: 11 MG/DL (ref 6–23)
CALCIUM SERPL-MCNC: 8.5 MG/DL (ref 8.6–10.6)
CHLORIDE SERPL-SCNC: 103 MMOL/L (ref 98–107)
CO2 SERPL-SCNC: 29 MMOL/L (ref 21–32)
CREAT SERPL-MCNC: 0.44 MG/DL (ref 0.5–1.05)
EGFRCR SERPLBLD CKD-EPI 2021: >90 ML/MIN/1.73M*2
ERYTHROCYTE [DISTWIDTH] IN BLOOD BY AUTOMATED COUNT: 13.7 % (ref 11.5–14.5)
GLUCOSE BLD MANUAL STRIP-MCNC: 107 MG/DL (ref 74–99)
GLUCOSE SERPL-MCNC: 150 MG/DL (ref 74–99)
HCT VFR BLD AUTO: 29.2 % (ref 36–46)
HGB BLD-MCNC: 9.5 G/DL (ref 12–16)
MCH RBC QN AUTO: 31.1 PG (ref 26–34)
MCHC RBC AUTO-ENTMCNC: 32.5 G/DL (ref 32–36)
MCV RBC AUTO: 96 FL (ref 80–100)
NRBC BLD-RTO: 0 /100 WBCS (ref 0–0)
PHOSPHATE SERPL-MCNC: 1.7 MG/DL (ref 2.5–4.9)
PLATELET # BLD AUTO: 217 X10*3/UL (ref 150–450)
POTASSIUM SERPL-SCNC: 3.4 MMOL/L (ref 3.5–5.3)
RBC # BLD AUTO: 3.05 X10*6/UL (ref 4–5.2)
SODIUM SERPL-SCNC: 140 MMOL/L (ref 136–145)
WBC # BLD AUTO: 11 X10*3/UL (ref 4.4–11.3)

## 2024-04-16 PROCEDURE — 2500000005 HC RX 250 GENERAL PHARMACY W/O HCPCS: Performed by: STUDENT IN AN ORGANIZED HEALTH CARE EDUCATION/TRAINING PROGRAM

## 2024-04-16 PROCEDURE — 2500000004 HC RX 250 GENERAL PHARMACY W/ HCPCS (ALT 636 FOR OP/ED): Performed by: STUDENT IN AN ORGANIZED HEALTH CARE EDUCATION/TRAINING PROGRAM

## 2024-04-16 PROCEDURE — 2500000001 HC RX 250 WO HCPCS SELF ADMINISTERED DRUGS (ALT 637 FOR MEDICARE OP): Performed by: STUDENT IN AN ORGANIZED HEALTH CARE EDUCATION/TRAINING PROGRAM

## 2024-04-16 PROCEDURE — 2500000004 HC RX 250 GENERAL PHARMACY W/ HCPCS (ALT 636 FOR OP/ED): Performed by: PHYSICIAN ASSISTANT

## 2024-04-16 PROCEDURE — 2500000005 HC RX 250 GENERAL PHARMACY W/O HCPCS: Performed by: PHYSICIAN ASSISTANT

## 2024-04-16 PROCEDURE — 72082 X-RAY EXAM ENTIRE SPI 2/3 VW: CPT

## 2024-04-16 PROCEDURE — 2500000001 HC RX 250 WO HCPCS SELF ADMINISTERED DRUGS (ALT 637 FOR MEDICARE OP)

## 2024-04-16 PROCEDURE — 2500000001 HC RX 250 WO HCPCS SELF ADMINISTERED DRUGS (ALT 637 FOR MEDICARE OP): Performed by: PHYSICIAN ASSISTANT

## 2024-04-16 PROCEDURE — 97116 GAIT TRAINING THERAPY: CPT | Mod: GP,CQ

## 2024-04-16 PROCEDURE — 85027 COMPLETE CBC AUTOMATED: CPT | Mod: PORLAB

## 2024-04-16 PROCEDURE — 80069 RENAL FUNCTION PANEL: CPT | Mod: PORLAB

## 2024-04-16 PROCEDURE — 72082 X-RAY EXAM ENTIRE SPI 2/3 VW: CPT | Performed by: RADIOLOGY

## 2024-04-16 PROCEDURE — 82947 ASSAY GLUCOSE BLOOD QUANT: CPT

## 2024-04-16 PROCEDURE — 1100000001 HC PRIVATE ROOM DAILY

## 2024-04-16 RX ORDER — CAFFEINE 200 MG
200 TABLET ORAL EVERY 6 HOURS PRN
Status: DISCONTINUED | OUTPATIENT
Start: 2024-04-16 | End: 2024-04-17 | Stop reason: HOSPADM

## 2024-04-16 RX ORDER — CALCIUM CARBONATE 200(500)MG
500 TABLET,CHEWABLE ORAL DAILY PRN
Status: DISCONTINUED | OUTPATIENT
Start: 2024-04-16 | End: 2024-04-17 | Stop reason: HOSPADM

## 2024-04-16 RX ORDER — OXYCODONE HYDROCHLORIDE 5 MG/1
5 TABLET ORAL EVERY 6 HOURS PRN
Status: DISCONTINUED | OUTPATIENT
Start: 2024-04-16 | End: 2024-04-17 | Stop reason: HOSPADM

## 2024-04-16 RX ORDER — LOSARTAN POTASSIUM 50 MG/1
50 TABLET ORAL DAILY
Status: DISCONTINUED | OUTPATIENT
Start: 2024-04-17 | End: 2024-04-17 | Stop reason: HOSPADM

## 2024-04-16 RX ORDER — OXYCODONE HYDROCHLORIDE 5 MG/1
2.5 TABLET ORAL EVERY 6 HOURS PRN
Status: DISCONTINUED | OUTPATIENT
Start: 2024-04-16 | End: 2024-04-17 | Stop reason: HOSPADM

## 2024-04-16 RX ORDER — SODIUM CHLORIDE 9 MG/ML
100 INJECTION, SOLUTION INTRAVENOUS CONTINUOUS
Status: DISCONTINUED | OUTPATIENT
Start: 2024-04-16 | End: 2024-04-17 | Stop reason: HOSPADM

## 2024-04-16 RX ADMIN — SODIUM CHLORIDE 100 ML/HR: 9 INJECTION, SOLUTION INTRAVENOUS at 06:40

## 2024-04-16 RX ADMIN — HEPARIN SODIUM 5000 UNITS: 5000 INJECTION INTRAVENOUS; SUBCUTANEOUS at 21:04

## 2024-04-16 RX ADMIN — HEPARIN SODIUM 5000 UNITS: 5000 INJECTION INTRAVENOUS; SUBCUTANEOUS at 12:00

## 2024-04-16 RX ADMIN — OXYCODONE HYDROCHLORIDE 2.5 MG: 5 TABLET ORAL at 16:29

## 2024-04-16 RX ADMIN — SENNOSIDES AND DOCUSATE SODIUM 1 TABLET: 8.6; 5 TABLET ORAL at 09:28

## 2024-04-16 RX ADMIN — PANTOPRAZOLE SODIUM 40 MG: 40 TABLET, DELAYED RELEASE ORAL at 07:00

## 2024-04-16 RX ADMIN — ATORVASTATIN CALCIUM 10 MG: 10 TABLET, FILM COATED ORAL at 09:28

## 2024-04-16 RX ADMIN — LOSARTAN POTASSIUM 25 MG: 25 TABLET, FILM COATED ORAL at 09:28

## 2024-04-16 RX ADMIN — POLYETHYLENE GLYCOL 3350 17 G: 17 POWDER, FOR SOLUTION ORAL at 09:29

## 2024-04-16 RX ADMIN — POTASSIUM PHOSPHATE, MONOBASIC AND POTASSIUM PHOSPHATE, DIBASIC 21 MMOL: 224; 236 INJECTION, SOLUTION, CONCENTRATE INTRAVENOUS at 15:56

## 2024-04-16 RX ADMIN — ONDANSETRON 4 MG: 2 INJECTION INTRAMUSCULAR; INTRAVENOUS at 15:56

## 2024-04-16 RX ADMIN — SENNOSIDES AND DOCUSATE SODIUM 1 TABLET: 8.6; 5 TABLET ORAL at 21:04

## 2024-04-16 RX ADMIN — ONDANSETRON 4 MG: 2 INJECTION INTRAMUSCULAR; INTRAVENOUS at 05:09

## 2024-04-16 RX ADMIN — CYCLOBENZAPRINE 5 MG: 10 TABLET, FILM COATED ORAL at 09:28

## 2024-04-16 RX ADMIN — LIDOCAINE 2 PATCH: 4 PATCH TOPICAL at 09:29

## 2024-04-16 RX ADMIN — SODIUM CHLORIDE 100 ML/HR: 9 INJECTION, SOLUTION INTRAVENOUS at 10:09

## 2024-04-16 RX ADMIN — OXYCODONE HYDROCHLORIDE 5 MG: 5 TABLET ORAL at 07:01

## 2024-04-16 RX ADMIN — CYCLOBENZAPRINE 5 MG: 10 TABLET, FILM COATED ORAL at 21:04

## 2024-04-16 RX ADMIN — CALCIUM CARBONATE (ANTACID) CHEW TAB 500 MG 500 MG: 500 CHEW TAB at 05:43

## 2024-04-16 RX ADMIN — OXYCODONE HYDROCHLORIDE 5 MG: 5 TABLET ORAL at 21:04

## 2024-04-16 RX ADMIN — HEPARIN SODIUM 5000 UNITS: 5000 INJECTION INTRAVENOUS; SUBCUTANEOUS at 05:09

## 2024-04-16 RX ADMIN — POLYETHYLENE GLYCOL 3350 17 G: 17 POWDER, FOR SOLUTION ORAL at 21:04

## 2024-04-16 RX ADMIN — ONDANSETRON 4 MG: 2 INJECTION INTRAMUSCULAR; INTRAVENOUS at 20:54

## 2024-04-16 ASSESSMENT — PAIN - FUNCTIONAL ASSESSMENT
PAIN_FUNCTIONAL_ASSESSMENT: 0-10

## 2024-04-16 ASSESSMENT — COGNITIVE AND FUNCTIONAL STATUS - GENERAL
STANDING UP FROM CHAIR USING ARMS: A LITTLE
DRESSING REGULAR UPPER BODY CLOTHING: A LITTLE
DRESSING REGULAR LOWER BODY CLOTHING: A LITTLE
TOILETING: A LITTLE
MOVING TO AND FROM BED TO CHAIR: A LITTLE
MOBILITY SCORE: 18
DRESSING REGULAR LOWER BODY CLOTHING: A LITTLE
MOVING TO AND FROM BED TO CHAIR: A LITTLE
MOBILITY SCORE: 18
HELP NEEDED FOR BATHING: A LITTLE
CLIMB 3 TO 5 STEPS WITH RAILING: A LITTLE
DRESSING REGULAR UPPER BODY CLOTHING: A LITTLE
WALKING IN HOSPITAL ROOM: A LITTLE
MOVING FROM LYING ON BACK TO SITTING ON SIDE OF FLAT BED WITH BEDRAILS: A LITTLE
WALKING IN HOSPITAL ROOM: A LITTLE
STANDING UP FROM CHAIR USING ARMS: A LITTLE
CLIMB 3 TO 5 STEPS WITH RAILING: A LITTLE
TURNING FROM BACK TO SIDE WHILE IN FLAT BAD: A LITTLE
TURNING FROM BACK TO SIDE WHILE IN FLAT BAD: A LITTLE
TOILETING: A LITTLE
DAILY ACTIVITIY SCORE: 20
HELP NEEDED FOR BATHING: A LITTLE
DAILY ACTIVITIY SCORE: 20
MOVING FROM LYING ON BACK TO SITTING ON SIDE OF FLAT BED WITH BEDRAILS: A LITTLE

## 2024-04-16 ASSESSMENT — PAIN SCALES - PAIN ASSESSMENT IN ADVANCED DEMENTIA (PAINAD): CONSOLABILITY: DISTRACTED OR REASSURED BY VOICE/TOUCH

## 2024-04-16 ASSESSMENT — PAIN DESCRIPTION - LOCATION
LOCATION: HEAD
LOCATION: HEAD

## 2024-04-16 ASSESSMENT — PAIN SCALES - GENERAL
PAINLEVEL_OUTOF10: 0 - NO PAIN
PAINLEVEL_OUTOF10: 0 - NO PAIN
PAINLEVEL_OUTOF10: 7
PAINLEVEL_OUTOF10: 5 - MODERATE PAIN
PAINLEVEL_OUTOF10: 8

## 2024-04-16 ASSESSMENT — PAIN SCALES - WONG BAKER: WONGBAKER_NUMERICALRESPONSE: NO HURT

## 2024-04-16 NOTE — PROGRESS NOTES
"Physical Therapy    Physical Therapy Treatment    Patient Name: Ashley Mcnair \"Alysa\"  MRN: [id number]  Today's Date: 4/16/2024  Time Calculation  Start Time: 1031  Stop Time: 1043  Time Calculation (min): 12 min       Assessment/Plan   PT Assessment  PT Assessment Results: Decreased strength, Decreased endurance, Impaired balance, Decreased mobility  Rehab Prognosis: Good  Evaluation/Treatment Tolerance: Patient tolerated treatment well  Medical Staff Made Aware: Yes  End of Session Communication: Bedside nurse  Assessment Comment: Pt is progressing well, remains appropriate for low intensity therapy  End of Session Patient Position: Bed, 3 rail up, Alarm off, not on at start of session     PT Plan  Treatment/Interventions: Bed mobility, Transfer training, Gait training, Stair training, Balance training, Neuromuscular re-education, Strengthening, Therapeutic activity, Therapeutic exercise, Endurance training, Home exercise program, Positioning, Postural re-education  PT Plan: Skilled PT  PT Frequency: Daily  PT Discharge Recommendations: Low intensity level of continued care  Equipment Recommended upon Discharge: Wheeled walker  PT Recommended Transfer Status: Assist x1  PT - OK to Discharge: Yes (Once medically cleared)      General Visit Information:   PT  Visit  PT Received On: 04/16/24  Response to Previous Treatment: Patient with no complaints from previous session.  General  Prior to Session Communication: Bedside nurse  Patient Position Received: Bed, 3 rail up, Alarm off, not on at start of session  General Comment: Pt supine in bed, pleasant and motivated for therapy  Per handoff with RN, pt is appropriate for therapy, vitals are stable and pain is controlled. Other concerns prior to tx are: none    Subjective   Precautions:  Precautions  Medical Precautions: Fall precautions  Post-Surgical Precautions: Spinal precautions    Objective   Pain:  Pain Assessment  Pain Assessment: 0-10  Pain Score: 0 - No " pain  Cognition:  Cognition  Overall Cognitive Status: Within Functional Limits    Treatments:     Bed Mobility  Bed Mobility: Yes  Bed Mobility 1  Bed Mobility 1: Supine to sitting, Sitting to supine  Level of Assistance 1: Close supervision    Ambulation/Gait Training  Ambulation/Gait Training Performed: Yes  Ambulation/Gait Training 1  Surface 1: Level tile, Carpet  Device 1: Rolling walker  Assistance 1: Close supervision  Comments/Distance (ft) 1: 500'x1  Transfers  Transfer: Yes  Transfer 1  Transfer From 1: Sit to, Stand to  Transfer to 1: Sit  Technique 1: Sit to stand, Stand to sit  Transfer Device 1: Walker  Transfer Level of Assistance 1: Distant supervision    Stairs  Stairs: Yes  Stairs  Rails 1: Bilateral  Device 1: Railing  Assistance 1: Close supervision  Comment/Number of Steps 1: up/down 4 steps with non-recip pattern    Outcome Measures:     [facility] Basic Mobility  Turning from your back to your side while in a flat bed without using bedrails: A little  Moving from lying on your back to sitting on the side of a flat bed without using bedrails: A little  Moving to and from bed to chair (including a wheelchair): A little  Standing up from a chair using your arms (e.g. wheelchair or bedside chair): A little  To walk in hospital room: A little  Climbing 3-5 steps with railing: A little  Basic Mobility - Total Score: 18    Education Documentation  Precautions, taught by Dorita Mcgowan PTA at 4/16/2024 11:15 AM.  Learner: Patient  Readiness: Acceptance  Method: Explanation, Demonstration  Response: Verbalizes Understanding, Demonstrated Understanding    Body Mechanics, taught by Dorita Mcgowan PTA at 4/16/2024 11:15 AM.  Learner: Patient  Readiness: Acceptance  Method: Explanation, Demonstration  Response: Verbalizes Understanding, Demonstrated Understanding    Mobility Training, taught by Dorita Mcgowan PTA at 4/16/2024 11:15 AM.  Learner: Patient  Readiness: Acceptance  Method:  Explanation, Demonstration  Response: Verbalizes Understanding, Demonstrated Understanding    Education Comments  No comments found.        OP EDUCATION:       Encounter Problems       Encounter Problems (Active)       PT Problem       Pt will perform supine to sit transfer independently  (Progressing)       Start:  04/13/24    Expected End:  04/27/24            Pt will perform sit to stand transfer with mod I and LRAD  (Progressing)       Start:  04/13/24    Expected End:  04/27/24            Pt will ambulate 500 feet mod I with LRAD  (Progressing)       Start:  04/13/24    Expected End:  04/27/24            Pt will ascend/descend 10 steps Independently  (Progressing)       Start:  04/13/24    Expected End:  04/27/24                 CARLITOS Hendrix

## 2024-04-16 NOTE — PROGRESS NOTES
"Alysa Mcnair is a 73 y.o. female on day 4 of admission presenting with Idiopathic scoliosis in adult patient.    Subjective   Incisional pain improved.  Has headache and nausea since yesterday.  She believes its worst when she first gets up in the morning.  Daughter does not think it correlates with position or timing of day.      Objective     Physical Exam  Aox3  FC x4 5/5 throughout   SILT       Last Recorded Vitals  Blood pressure 144/86, pulse 95, temperature 36.5 °C (97.7 °F), temperature source Temporal, resp. rate 18, height 1.6 m (5' 2.99\"), weight 61.4 kg (135 lb 5.8 oz), SpO2 90%.  Intake/Output last 3 Shifts:  I/O last 3 completed shifts:  In: 47.3 (0.8 mL/kg) [I.V.:47.3 (0.8 mL/kg)]  Out: 895 (14.6 mL/kg) [Urine:400 (0.2 mL/kg/hr); Drains:495]  Weight: 61.4 kg     Relevant Results                             Assessment/Plan   Principal Problem:    Idiopathic scoliosis in adult patient  Active Problems:    Lumbar foraminal stenosis    Lumbar radiculopathy, chronic    Scoliosis of thoracolumbar region due to degenerative disease of spine in adult    Notalgia    73F h/o GERD, HLD, fibromyalgia, HTN, severe postop nausea, p/w progressive LBP, RLE radiculopathy, worsening coronal scoliosis, 4/12 s/p T10  - pelvis instrumentation, L1-S1 PCOs, L4/5, L5/S1 TLIF  4/14 drain auto dc'd (with stitch), SCx1    Plan  - Floor  - Drain x1 - off suction today, dc today  - fluids and caffeine for HA management   - encourage BM  - q12 labs  - EOS today  - SCDs, SQH  - PTOT - low intensity    Dispo pending Ha and nausea           Pari Gandhi MD      "

## 2024-04-16 NOTE — CARE PLAN
The patient's goals for the shift include  get adequate rest.     The clinical goals for the shift include Patient will sustain tolerable pain level of 2-3 throughout shift    Over the shift, the patient did not make progress toward the following goals. Barriers to progression include nausea. Recommendations to address these barriers include following care plan. .

## 2024-04-17 VITALS
BODY MASS INDEX: 23.98 KG/M2 | WEIGHT: 135.36 LBS | HEART RATE: 103 BPM | HEIGHT: 63 IN | SYSTOLIC BLOOD PRESSURE: 164 MMHG | DIASTOLIC BLOOD PRESSURE: 93 MMHG | OXYGEN SATURATION: 94 % | TEMPERATURE: 96.8 F | RESPIRATION RATE: 16 BRPM

## 2024-04-17 PROCEDURE — 2500000004 HC RX 250 GENERAL PHARMACY W/ HCPCS (ALT 636 FOR OP/ED): Performed by: STUDENT IN AN ORGANIZED HEALTH CARE EDUCATION/TRAINING PROGRAM

## 2024-04-17 PROCEDURE — 97110 THERAPEUTIC EXERCISES: CPT | Mod: GP,CQ

## 2024-04-17 PROCEDURE — 99232 SBSQ HOSP IP/OBS MODERATE 35: CPT | Performed by: PHYSICIAN ASSISTANT

## 2024-04-17 PROCEDURE — 2500000001 HC RX 250 WO HCPCS SELF ADMINISTERED DRUGS (ALT 637 FOR MEDICARE OP)

## 2024-04-17 PROCEDURE — 2500000001 HC RX 250 WO HCPCS SELF ADMINISTERED DRUGS (ALT 637 FOR MEDICARE OP): Performed by: STUDENT IN AN ORGANIZED HEALTH CARE EDUCATION/TRAINING PROGRAM

## 2024-04-17 PROCEDURE — 97530 THERAPEUTIC ACTIVITIES: CPT | Mod: GP,CQ

## 2024-04-17 PROCEDURE — 97535 SELF CARE MNGMENT TRAINING: CPT | Mod: GO

## 2024-04-17 PROCEDURE — 2500000005 HC RX 250 GENERAL PHARMACY W/O HCPCS: Performed by: STUDENT IN AN ORGANIZED HEALTH CARE EDUCATION/TRAINING PROGRAM

## 2024-04-17 PROCEDURE — 2500000001 HC RX 250 WO HCPCS SELF ADMINISTERED DRUGS (ALT 637 FOR MEDICARE OP): Performed by: PHYSICIAN ASSISTANT

## 2024-04-17 RX ORDER — ONDANSETRON 4 MG/1
4 TABLET, FILM COATED ORAL EVERY 8 HOURS PRN
Qty: 15 TABLET | Refills: 0 | Status: SHIPPED | OUTPATIENT
Start: 2024-04-17 | End: 2024-04-22

## 2024-04-17 RX ORDER — LIDOCAINE 560 MG/1
2 PATCH PERCUTANEOUS; TOPICAL; TRANSDERMAL DAILY
Qty: 5 PATCH | Refills: 0 | Status: SHIPPED | OUTPATIENT
Start: 2024-04-17 | End: 2024-04-22

## 2024-04-17 RX ORDER — OXYCODONE HYDROCHLORIDE 5 MG/1
5 TABLET ORAL ONCE
Status: COMPLETED | OUTPATIENT
Start: 2024-04-17 | End: 2024-04-17

## 2024-04-17 RX ORDER — OXYCODONE HYDROCHLORIDE 5 MG/1
2.5 TABLET ORAL EVERY 6 HOURS PRN
Qty: 14 TABLET | Refills: 0 | Status: SHIPPED | OUTPATIENT
Start: 2024-04-17 | End: 2024-04-24

## 2024-04-17 RX ORDER — CYCLOBENZAPRINE HCL 5 MG
5 TABLET ORAL 3 TIMES DAILY
Qty: 15 TABLET | Refills: 0 | Status: SHIPPED | OUTPATIENT
Start: 2024-04-17 | End: 2024-04-22

## 2024-04-17 RX ORDER — POLYETHYLENE GLYCOL 3350 17 G/17G
17 POWDER, FOR SOLUTION ORAL 2 TIMES DAILY PRN
Qty: 10 PACKET | Refills: 0 | Status: SHIPPED | OUTPATIENT
Start: 2024-04-17 | End: 2024-04-22

## 2024-04-17 RX ORDER — AMOXICILLIN 250 MG
1 CAPSULE ORAL 2 TIMES DAILY
Qty: 10 TABLET | Refills: 0 | Status: SHIPPED | OUTPATIENT
Start: 2024-04-17 | End: 2024-04-22

## 2024-04-17 RX ADMIN — ONDANSETRON HYDROCHLORIDE 4 MG: 4 TABLET, FILM COATED ORAL at 13:37

## 2024-04-17 RX ADMIN — ATORVASTATIN CALCIUM 10 MG: 10 TABLET, FILM COATED ORAL at 09:34

## 2024-04-17 RX ADMIN — ONDANSETRON 4 MG: 2 INJECTION INTRAMUSCULAR; INTRAVENOUS at 05:02

## 2024-04-17 RX ADMIN — PANTOPRAZOLE SODIUM 40 MG: 40 TABLET, DELAYED RELEASE ORAL at 06:31

## 2024-04-17 RX ADMIN — OXYCODONE HYDROCHLORIDE 5 MG: 5 TABLET ORAL at 06:31

## 2024-04-17 RX ADMIN — SENNOSIDES AND DOCUSATE SODIUM 1 TABLET: 8.6; 5 TABLET ORAL at 09:33

## 2024-04-17 RX ADMIN — LOSARTAN POTASSIUM 50 MG: 50 TABLET, FILM COATED ORAL at 09:33

## 2024-04-17 RX ADMIN — OXYCODONE HYDROCHLORIDE 2.5 MG: 5 TABLET ORAL at 13:37

## 2024-04-17 RX ADMIN — HEPARIN SODIUM 5000 UNITS: 5000 INJECTION INTRAVENOUS; SUBCUTANEOUS at 05:04

## 2024-04-17 RX ADMIN — CALCIUM CARBONATE (ANTACID) CHEW TAB 500 MG 500 MG: 500 CHEW TAB at 11:01

## 2024-04-17 RX ADMIN — POLYETHYLENE GLYCOL 3350 17 G: 17 POWDER, FOR SOLUTION ORAL at 09:33

## 2024-04-17 ASSESSMENT — COGNITIVE AND FUNCTIONAL STATUS - GENERAL
DRESSING REGULAR UPPER BODY CLOTHING: A LITTLE
MOBILITY SCORE: 20
DRESSING REGULAR LOWER BODY CLOTHING: A LITTLE
DRESSING REGULAR UPPER BODY CLOTHING: A LITTLE
TOILETING: A LITTLE
CLIMB 3 TO 5 STEPS WITH RAILING: A LITTLE
HELP NEEDED FOR BATHING: A LITTLE
DRESSING REGULAR LOWER BODY CLOTHING: A LITTLE
HELP NEEDED FOR BATHING: A LITTLE
TOILETING: A LITTLE
MOVING TO AND FROM BED TO CHAIR: A LITTLE
WALKING IN HOSPITAL ROOM: A LITTLE
DAILY ACTIVITIY SCORE: 20
STANDING UP FROM CHAIR USING ARMS: A LITTLE
DAILY ACTIVITIY SCORE: 20

## 2024-04-17 ASSESSMENT — PAIN - FUNCTIONAL ASSESSMENT
PAIN_FUNCTIONAL_ASSESSMENT: 0-10

## 2024-04-17 ASSESSMENT — PAIN SCALES - GENERAL
PAINLEVEL_OUTOF10: 0 - NO PAIN

## 2024-04-17 ASSESSMENT — ACTIVITIES OF DAILY LIVING (ADL): HOME_MANAGEMENT_TIME_ENTRY: 16

## 2024-04-17 ASSESSMENT — PAIN DESCRIPTION - LOCATION: LOCATION: BACK

## 2024-04-17 NOTE — PROGRESS NOTES
"Physical Therapy    Physical Therapy Treatment    Patient Name: Ashley Mcnair \"Alysa\"  MRN: [id number]  Today's Date: 4/17/2024  Time Calculation  Start Time: 0927  Stop Time: 0951  Time Calculation (min): 24 min       Assessment/Plan   PT Assessment  PT Assessment Results: Decreased strength, Decreased endurance, Impaired balance, Decreased mobility  Rehab Prognosis: Good  Evaluation/Treatment Tolerance: Patient tolerated treatment well  Medical Staff Made Aware: Yes  End of Session Communication: Bedside nurse  Assessment Comment: Pt is progressing well, remains appropriate for low intensity therapy  End of Session Patient Position: Bed, 2 rail up, Alarm off, not on at start of session     PT Plan  Treatment/Interventions: Bed mobility, Transfer training, Gait training, Stair training, Balance training, Neuromuscular re-education, Strengthening, Therapeutic activity, Therapeutic exercise, Endurance training, Home exercise program, Positioning, Postural re-education  PT Plan: Skilled PT  PT Frequency: Daily  PT Discharge Recommendations: Low intensity level of continued care  Equipment Recommended upon Discharge: Wheeled walker  PT Recommended Transfer Status: Assist x1  PT - OK to Discharge: Yes (Once medically cleared)      General Visit Information:   PT  Visit  PT Received On: 04/17/24  Response to Previous Treatment: Patient with no complaints from previous session.  General  Family/Caregiver Present: Yes  Caregiver Feedback: daughter present and supportive  Prior to Session Communication: Bedside nurse  Patient Position Received: Bed, 3 rail up, Alarm off, not on at start of session  General Comment: Pt supine, pleasant and agreeable to therapy.    Subjective   Precautions:  Precautions  Medical Precautions: Fall precautions  Post-Surgical Precautions: Spinal precautions    Objective   Pain:  Pain Assessment  Pain Assessment: 0-10  Pain Score: 0 - No pain  Cognition:  Cognition  Overall Cognitive Status: " Within Functional Limits    Treatments:  Therapeutic Exercise  Therapeutic Exercise Performed: Yes  Therapeutic Exercise Activity 1: Pt instructed in and given HEP printout, completing supine SLR 2x5/LE, standing hip abd 2x10/LE, hip ext 1x10/LE, marching 2x10/LE, minisquats 1x5    Balance/Neuromuscular Re-Education  Balance/Neuromuscular Re-Education Activity Performed: Yes  Balance/Neuromuscular Re-Education Activity 1: Tinetti assessment completed    Bed Mobility  Bed Mobility: Yes  Bed Mobility 1  Bed Mobility 1: Supine to sitting, Sitting to supine  Level of Assistance 1: Modified independent  Bed Mobility Comments 1: good demo of log roll    Ambulation/Gait Training  Ambulation/Gait Training Performed: Yes  Ambulation/Gait Training 1  Surface 1: Level tile, Carpet  Device 1: No device  Assistance 1: Close supervision  Quality of Gait 1: Diminished heel strike, Decreased step length  Comments/Distance (ft) 1: 300'x1  Transfers  Transfer: Yes  Transfer 1  Transfer From 1: Sit to, Stand to  Transfer to 1: Sit  Technique 1: Sit to stand, Stand to sit  Transfer Device 1:  (no AD)  Transfer Level of Assistance 1: Distant supervision    Stairs  Stairs: Yes  Stairs  Rails 1: Bilateral  Device 1: Railing  Assistance 1: Close supervision  Comment/Number of Steps 1: 4 steps, non-recip pattern    Outcome Measures:     [facility] Basic Mobility  Turning from your back to your side while in a flat bed without using bedrails: None  Moving from lying on your back to sitting on the side of a flat bed without using bedrails: None  Moving to and from bed to chair (including a wheelchair): A little  Standing up from a chair using your arms (e.g. wheelchair or bedside chair): A little  To walk in hospital room: A little  Climbing 3-5 steps with railing: A little  Basic Mobility - Total Score: 20    [place]  Sitting Balance: Steady, safe  Arises: Able, uses arms to help  Attempts to Arise: Able to arise, one attempt  Immediate Standing  Balance (First 5 Seconds): Steady without walker or other support  Standing Balance: Narrow stance without support  Nudged: Steady without walker or other support  Eyes Closed: Unsteady  Turned 360 Degrees: Steadiness: Steady  Turned 360 Degrees: Continuity of Steps: Continuous  Sitting Down: Safe, smooth motion  Balance Score: 14  Initiation of Gait: No hesitancy  Step Height: R Swing Foot: Right foot complete clears floor  Step Length: R Swing Foot: Does not pass left stance foot with step  Step Height: L Swing Foot: Left foot complete clears floor  Step Length: L Swing Foot: Does not pass right stance foot with step  Step Symmetry: Right and left step appear equal  Step Continuity: Steps appear continuous  Path: Straight without walking aid  Trunk: No sway, no flexion, no use of arms, no walking aid  Walking Time: Heels almost touching while walking  Gait Score: 10  Total Score: 24    Education Documentation  Precautions, taught by Dorita Mcgowan PTA at 4/17/2024 10:24 AM.  Learner: Patient  Readiness: Acceptance  Method: Explanation, Demonstration  Response: Verbalizes Understanding, Demonstrated Understanding    Body Mechanics, taught by Dorita Mcgowan PTA at 4/17/2024 10:24 AM.  Learner: Patient  Readiness: Acceptance  Method: Explanation, Demonstration  Response: Verbalizes Understanding, Demonstrated Understanding    Mobility Training, taught by Dorita Mcgowan PTA at 4/17/2024 10:24 AM.  Learner: Patient  Readiness: Acceptance  Method: Explanation, Demonstration  Response: Verbalizes Understanding, Demonstrated Understanding    Education Comments  No comments found.        OP EDUCATION:       Encounter Problems       Encounter Problems (Active)       PT Problem       Pt will perform supine to sit transfer independently  (Progressing)       Start:  04/13/24    Expected End:  04/27/24            Pt will perform sit to stand transfer with mod I and LRAD  (Progressing)       Start:  04/13/24     Expected End:  04/27/24            Pt will ambulate 500 feet mod I with LRAD  (Progressing)       Start:  04/13/24    Expected End:  04/27/24            Pt will ascend/descend 10 steps Independently  (Progressing)       Start:  04/13/24    Expected End:  04/27/24                 CARLITOS Hendrix

## 2024-04-17 NOTE — DISCHARGE SUMMARY
Discharge Diagnosis  Idiopathic scoliosis in adult patient    Test Results Pending At Discharge  Pending Labs       No current pending labs.            Hospital Course  Ashley Mcnair is a 73 y.o. female with a past medical history of GERD, HLD, fibromyalgia, HTN and prior severe postop nausea who presented with progressive LBP, RLE radiculopathy and worsening coronal scoliosis. Patient taken to the OR on 4/12 for T10-pelvis instrumentation, L1-S1 PCOs, L4/5 and L5/S1 TLIF. Patient transfused with 1unit PRBCs due to drop in postoperative Hbg. 1 of 2 post operative drains were auto-discontinued on 4/14 and insertion site stitched closed. Remaining drain removed 4/16. Patient with significant post operative nausea/vomiting with onset of headache POD#2 which ultimately improved with IV Zofran/phenergan, hydration and caffeine. PT/OT evaluated patient and recommended low intensity level of continued care for which referral placed for home health care. On the day of discharge, the patient was seen and evaluated by the neurosurgery team and deemed suitable for discharge home.  There were no significant events overnight. Vitals were reviewed and within normal limits. Labs were stable at discharge. On day of discharge the patient was tolerating a diet, pain was controlled on PO pain medication, was ambulating well and voiding spontaneously. The patient was given detailed discharge instructions and were scheduled to follow up as an outpatient.     Pertinent Physical Exam At Time of Discharge  Constitutional: A&Ox3, calm and cooperative, NAD.  Eyes: PERRL, clear sclera.  ENMT: Moist mucous membranes, no apparent injuries or lesions.  Head/Neck: Neck supple, no JVD. NC/AT.   Cardiovascular: Normal rate and regular rhythm. 2+ equal pulses of the distal extremities.  Respiratory/Thorax: CTAB, regular respirations on RA. Good symmetric chest expansion.   Gastrointestinal: Abdomen soft, non tender.   Genitourinary: Voiding  independently.   Extremities: Follow commands x4. 5/5 strength throughout. Sensation intact to light touch.   Neurological: A&Ox3.   Psychological: Appropriate mood and behavior.     Home Medications     Medication List      START taking these medications     cyclobenzaprine 5 mg tablet; Commonly known as: Flexeril; Take 1 tablet   (5 mg) by mouth 3 times a day for 5 days.   lidocaine 4 % patch; Place 2 patches over 12 hours on the skin once   daily for 5 days. Remove & discard patch within 12 hours or as directed by   MD.   ondansetron 4 mg tablet; Commonly known as: Zofran; Take 1 tablet (4 mg)   by mouth every 8 hours if needed for nausea or vomiting for up to 5 days.   oxyCODONE 5 mg immediate release tablet; Commonly known as: Roxicodone;   Take 0.5 tablets (2.5 mg) by mouth every 6 hours if needed for severe pain   (7 - 10) for up to 7 days.   polyethylene glycol 17 gram packet; Commonly known as: Glycolax,   Miralax; Take 17 g by mouth 2 times a day as needed (consitpation) for up   to 5 days.   sennosides-docusate sodium 8.6-50 mg tablet; Commonly known as:   Shahnaz-Colace; Take 1 tablet by mouth 2 times a day for 5 days.     CONTINUE taking these medications     atorvastatin 10 mg tablet; Commonly known as: Lipitor   brimonidine-timoloL 0.2-0.5 % ophthalmic solution; Commonly known as:   Combigan   calcium carbonate 600 mg calcium (1,500 mg) tablet   cholecalciferol 25 MCG (1000 UT) capsule; Commonly known as: Vitamin D-3   cranberry 400 mg capsule   cyanocobalamin 1,000 mcg tablet; Commonly known as: Vitamin B-12   Evenity 105 mg/1.17 mL syringe; Generic drug: romosozumab-aqqg   losartan 50 mg tablet; Commonly known as: Cozaar   milk thistle 175 mg tablet   pantoprazole 40 mg EC tablet; Commonly known as: ProtoNix     STOP taking these medications     chlorhexidine 0.12 % solution; Commonly known as: Peridex   meloxicam 7.5 mg tablet; Commonly known as: Mobic   pregabalin 75 mg capsule; Commonly known as:  Lyrica     Outpatient Follow-Up  Future Appointments   Date Time Provider Department Center   5/1/2024 10:00 AM NEUROSURGERY [facility] NURSE [id number] [facility]   5/23/2024 11:00 AM MD CATERINA Le1 Inocente Keane PA-C

## 2024-04-17 NOTE — DOCUMENTATION CLARIFICATION NOTE
"    PATIENT:               CESIA BUCKNER  ACCT #:                  [id number]  MRN:                       [id number]  :                       1950  ADMIT DATE:       2024 5:59 AM  DISCH DATE:  RESPONDING PROVIDER #:        [id number]          PROVIDER RESPONSE TEXT:    Acute blood loss anemia    CDI QUERY TEXT:    Clarification    Instruction:    Based on your assessment of the patient and the clinical information, please provide the requested documentation by clicking on the appropriate radio button and enter any additional information if prompted.    Question: Please further clarify the diagnosis of anemia as    When answering this query, please exercise your independent professional judgment. The fact that a question is being asked, does not imply that any particular answer is desired or expected.    The patient's clinical indicators include:  Clinical Information: 73F h/o GERD, HLD, HTN,  p/w progressive LBP, RLE radiculopathy, worsening coronal scoliosis,  s/p T10  pelvis instrumentation, L1-S1 PCOs, L4/5, L5/S1 TLIF    Clinical Indicators:   Brief Op note by Dr. Kim \" Estimated blood loss  1500ml \"     Pn by Dr. Gandhi \" Q6 labs\" In \"Blood:700\"  Out: \"  Blood:1600\"    Treatment: Monitoring labs Q 6 hour labs,  prbc 700 ml    Risk Factors: EBL 1500 ml  Options provided:  -- Acute blood loss anemia  -- Other - I will add my own diagnosis  -- Refer to Clinical Documentation Reviewer    Query created by: Barbara Vazquez on 4/15/2024 10:59 AM      Electronically signed by:  NATALY AGNDHI MD 2024 5:24 AM          "

## 2024-04-17 NOTE — PROGRESS NOTES
"Alysa Mcnair is a 73 y.o. female on day 5 of admission presenting with Idiopathic scoliosis in adult patient.    Subjective   Headache and nausea significantly improved compared to yesterday.  Had BM yesterday.  Feels ready to go home.       Objective     Physical Exam  Aox3  FC x4 5/5 throughout   SILT       Last Recorded Vitals  Blood pressure (!) 171/97, pulse 103, temperature 36.8 °C (98.2 °F), resp. rate 16, height 1.6 m (5' 2.99\"), weight 61.4 kg (135 lb 5.8 oz), SpO2 95%.  Intake/Output last 3 Shifts:  I/O last 3 completed shifts:  In: 133.3 (2.2 mL/kg) [IV Piggyback:133.3]  Out: 175 (2.9 mL/kg) [Drains:175]  Weight: 61.4 kg     Relevant Results                             Assessment/Plan   Principal Problem:    Idiopathic scoliosis in adult patient  Active Problems:    Lumbar foraminal stenosis    Lumbar radiculopathy, chronic    Scoliosis of thoracolumbar region due to degenerative disease of spine in adult    Notalgia    73F h/o GERD, HLD, fibromyalgia, HTN, severe postop nausea, p/w progressive LBP, RLE radiculopathy, worsening coronal scoliosis, 4/12 s/p T10  - pelvis instrumentation, L1-S1 PCOs, L4/5, L5/S1 TLIF  4/14 drain auto dc'd (with stitch), Scx1  4/16 drain dc'd, scoli films with hardware in position    Plan  - Floor  - SCDs, SQH  - PTOT - HC    Patient medically ready for discharge           Pari Gandhi MD      "

## 2024-04-17 NOTE — PROGRESS NOTES
Care Transitions Progress note:   Patient will discharge today to home with [facility] PT:OT and RN.

## 2024-04-17 NOTE — PROGRESS NOTES
"Occupational Therapy    Occupational Therapy Treatment    Name: Ashley Mcnair \"Alysa\"  MRN: [id number]  : 1950  Date: 24  Time Calculation  Start Time: 1204  Stop Time: 1220  Time Calculation (min): 16 min    Assessment:  OT Assessment: Pt dmeonstrated ability to particiapte in bed mobility, functional transfers and UB/LB dressing activities with supervision and modified assistance once educated on use of adaptive equipment. Pt safe to return home with assistance from family as needed.  End of Session Communication: Bedside nurse  End of Session Patient Position: Bed, 3 rail up, Alarm off, caregiver present  Plan:  Treatment Interventions: ADL retraining, Functional transfer training, UE strengthening/ROM, Endurance training, Compensatory technique education, Equipment evaluation/education, Patient/family training  OT Frequency: 3 times per week  OT Discharge Recommendations: Low intensity level of continued care  Equipment Recommended upon Discharge: Wheeled walker  OT Recommended Transfer Status: Assist of 1  OT - OK to Discharge: Yes    Subjective   Previous Visit Info:  OT Last Visit  OT Received On: 24  General:  General  Reason for Referral: p/w progressive LBP, RLE radiculopathy, worsening coronal scoliosis,  s/p T10  - pelvis instrumentation, L1-S1 PCOs, L4/5, L5/S1 TLIF  Past Medical History Relevant to Rehab: GERD, HLD, fibromyalgia, HTN  Prior to Session Communication: Bedside nurse  Patient Position Received: Bed, 3 rail up, Alarm off, caregiver present  General Comment: Pt cleared for therapy, per RN pt is getting ready to return home today, session focused on AE and compensatory strategies for dressing activities in preperation for return home.  Precautions:  Medical Precautions: Fall precautions  Post-Surgical Precautions: Spinal precautions  Precautions Comment: pt maintained precautions throughout session, pt able to recall spinal precautions     Pain Assessment:  Pain " Assessment  Pain Assessment: 0-10  Pain Score: 0 - No pain     Objective   Activities of Daily Living: UE Dressing  UE Dressing Level of Assistance: Minimum assistance, Setup  UE Dressing Where Assessed: Edge of bed  UE Dressing Comments: pt required assistance for behind back bra strap, able to doff gown and don long sleeve tshirt with setup assist.    LE Dressing  LE Dressing: Yes  LE Dressing Adaptive Equipment: Reacher, Sock aide, Shoe horn  Pants Level of Assistance: Setup, Moderate verbal cues, Modified independent  Sock Level of Assistance: Modified independent  Shoe Level of Assistance: Minimum assistance  LE Dressing Where Assessed: Edge of bed  LE Dressing Comments: Pt participated in LB dressing EOB and was educated on use of long handled reacher, sock aide, and long handled shoe horn for dressing upon d/c. Pt currently owns long handled reacher and family looking into purchase of sock aide. Pt able to don L sock with modified independence after demonstrated with R sock, able to utilize long handled reacher to thread LEs in underwear and pants while EOB. (family reports they are availabe at home to assist as needed)     Bed Mobility/Transfers: Bed Mobility  Bed Mobility: Yes  Bed Mobility 1  Bed Mobility 1: Supine to sitting  Level of Assistance 1: Modified independent  Bed Mobility Comments 1: HOB elevated, bed rails. Pt reports she has a bed that is able to elevate HOB and a grab bar to utilize upon d/c    Transfers  Transfer: Yes  Transfer 1  Transfer From 1: Sit to, Stand to  Transfer to 1: Stand, Sit  Technique 1: Sit to stand, Stand to sit  Transfer Level of Assistance 1: Distant supervision  Trials/Comments 1: x2    Sitting Balance:  Static Sitting Balance  Static Sitting-Balance Support: No upper extremity supported, Feet supported  Static Sitting-Level of Assistance: Independent  Dynamic Sitting Balance  Dynamic Sitting-Balance Support: No upper extremity supported  Dynamic Sitting-Comments: UB  and LB dressing using AE, close supervision  Standing Balance:  Dynamic Standing Balance  Dynamic Standing-Balance Support: No upper extremity supported  Dynamic Standing-Comments: able to pull up pants over hips with close supervision       Therapy/Activity: Therapeutic Activity  Therapeutic Activity Performed: Yes  Therapeutic Activity 1: UB and LB dressing activities using AE, functional transfrs and bed mobility       RUE   RUE : Within Functional Limits and LUE   LUE: Within Functional Limits        Outcome Measures:  [facility] Daily Activity  Putting on and taking off regular lower body clothing: A little  Bathing (including washing, rinsing, drying): A little  Putting on and taking off regular upper body clothing: A little  Toileting, which includes using toilet, bedpan or urinal: A little  Taking care of personal grooming such as brushing teeth: None  Eating Meals: None  Daily Activity - Total Score: 20        Education Documentation  Body Mechanics, taught by HENRIETTA Prakash at 4/17/2024  1:06 PM.  Learner: Family, Patient  Readiness: Eager  Method: Explanation, Demonstration  Response: Verbalizes Understanding, Demonstrated Understanding    Precautions, taught by HENRIETTA Prakash at 4/17/2024  1:06 PM.  Learner: Family, Patient  Readiness: Eager  Method: Explanation, Demonstration  Response: Verbalizes Understanding, Demonstrated Understanding    ADL Training, taught by HENRIETTA Prakash at 4/17/2024  1:06 PM.  Learner: Family, Patient  Readiness: Eager  Method: Explanation, Demonstration  Response: Verbalizes Understanding, Demonstrated Understanding    Education Comments  No comments found.      Goals:  Encounter Problems       Encounter Problems (Active)       ADLs       Patient with complete lower body dressing with minimal assist  level of assistance donning and doffing all LE clothes  with PRN adaptive equipment while edge of bed  (Progressing)       Start:  04/13/24    Expected End:   05/04/24            Patient will complete toileting including hygiene clothing management/hygiene with contact guard assist level of assistance and grab bars. (Progressing)       Start:  04/13/24    Expected End:  05/04/24               BALANCE       Pt will maintain dynamic standing balance during ADL task with stand by assist level of assistance in order to demonstrate decreased risk of falling and improved postural control. (Progressing)       Start:  04/13/24    Expected End:  05/04/24               COGNITION/SAFETY       Patient will recall and adhere to spine precautions during all functional mobility/ADL tasks in order to demonstrate improved understanding and promote healing post op (Progressing)       Start:  04/13/24    Expected End:  05/04/24               TRANSFERS       Patient will complete all functional transfers with least restrictive device with stand by assist level of assistance. (Progressing)       Start:  04/13/24    Expected End:  05/04/24

## 2024-04-23 ENCOUNTER — TELEPHONE (OUTPATIENT)
Dept: NEUROSURGERY | Facility: HOSPITAL | Age: 74
End: 2024-04-23

## 2024-04-23 NOTE — TELEPHONE ENCOUNTER
Talked to pt today after having a call yesterday about headache. I discussed with Dr. Whelan and he said for her to take caffeine , that she started yesterday and said that the headache is better today. She also said that her incision is with out redness, swelling or drainage. I will call her next week.

## 2024-04-24 DIAGNOSIS — Z98.1 S/P LUMBAR SPINAL FUSION: ICD-10-CM

## 2024-04-24 DIAGNOSIS — Z98.1 S/P FUSION OF THORACIC SPINE: ICD-10-CM

## 2024-04-30 ENCOUNTER — TELEPHONE (OUTPATIENT)
Dept: NEUROSURGERY | Facility: HOSPITAL | Age: 74
End: 2024-04-30
Payer: COMMERCIAL

## 2024-04-30 NOTE — TELEPHONE ENCOUNTER
Talked to pt and she said that her incision is with out redness, swelling or drainage. That she is doing better and has changed her pain medication. She will see Dr. Whelan on 5/23 and will get an xray prior to that appointment.

## 2024-05-01 ENCOUNTER — APPOINTMENT (OUTPATIENT)
Dept: NEUROSURGERY | Facility: HOSPITAL | Age: 74
End: 2024-05-01
Payer: COMMERCIAL

## 2024-05-20 NOTE — PROGRESS NOTES
I just had the pleasure of seeing Ms. Xu kee in the Neurosurgery Spine Clinic at the [facility]. She is a very pleasant 73 -year-old female, who recently underwent a T10-S1 Fusion with me on 4/12/2024 and is status post 6 weeks out from her surgery. Having headaches and nausea and vomited. Started a week ago. Headache gets better when she lies down but does not resolve completely.  More than the headache she has been having significant nausea.  Denies any weakness numbness tingling or any visual changes.    Constitutional: No fever or chills  Respiratory: No shortness of breath or wheezing  Musculoskeletal: see HPI above   Neurologic: See HPI above    NEURO: Neurologically patient is awake alert and oriented X 3    No obvious cranial nerve deficit.  Strength is almost 5/5 throughout all motor groups tested with no asymmetric significant motor deficit.  Deep tendon reflexes are symmetric throughout.   Gait : WNL   Sensory examination is intact to touch and painful stimuli.  Well-healed incision in the lower back with no evidence of any swelling whatsoever.    AP and lateral  long cassette scoliosis films were personally reviewed and shows well placed instrumentation with no evidence of any instrumentation failure. The overall spinal alignment seems to be within normal limits with no significant sagittal or coronal malalignment.      PLAN:  Ms. Mcnair is a really nice 73 y.o. female who is about 6 weeks out from her thoracolumbar reconstructive surgery.  She is doing very well neurologically and denies any lower extremity pain.  Her incision has healed completely.  Unfortunately she has noticed it is significant headache and nausea especially over the past few days.  She denies any weakness or any other neurological symptoms.  She does have headaches with that are worse when she is sitting up but does not resolve completely when she is lying down.  Most of the headaches she has is frontal.  I discussed  with her the various possible etiologies of her symptoms and given the fact that there was a durotomy and despite the fact that it was appropriately repaired,  I cannot rule out possibility of spinal fluid leak but given the fact that her incision has healed completely with no failure on the x-rays and no neurological deficits I believe there is no need for any surgical intervention as far as spine is concerned.  However her symptoms are pretty significant and I have ordered a stat CT scan of the head to rule out any intracranial pathology that could be causing her symptoms.  I have also provided her a prescription for antiemetics and recommend her to increase her food intake and take caffeine and try to manage it with reduction of her activity if possible.  If she does not improve over the next few days or so and based on the finding of the CT scan she may eventually require an MRI of the lumbar spine and if there would be any evidence of persistent spinal fluid leak she may be a candidate for an epidural blood patch.  Also discussed with her the option of going to emergency department and get it evaluated she has not decided what she would want to do.  It was a pleasure to participate in Ms. Mcnair clinical care. All questions were answered to her satisfaction and she explained understanding of the further treatment plan.     Pop Whelan MD, [facility] [place] [facility]   of Neurological Surgery  [employer]  Attending Surgeon  Director - Minimally Invasive Spine Surgery  [place], [place]      ---Some of this note was completed using Dragon voice recognition technology and sometimes the software misinterprets words. This may include unintended errors with respect to translation of words, typographical errors or grammar errors which may not have been identified prior to finalization of the chart note. Please take this into account when  reading this note---

## 2024-05-22 ENCOUNTER — TELEPHONE (OUTPATIENT)
Dept: NEUROSURGERY | Facility: HOSPITAL | Age: 74
End: 2024-05-22
Payer: COMMERCIAL

## 2024-05-22 ENCOUNTER — HOSPITAL ENCOUNTER (OUTPATIENT)
Dept: RADIOLOGY | Facility: CLINIC | Age: 74
Discharge: HOME | End: 2024-05-22
Payer: COMMERCIAL

## 2024-05-22 ENCOUNTER — HOSPITAL ENCOUNTER (OUTPATIENT)
Dept: RADIOLOGY | Facility: HOSPITAL | Age: 74
End: 2024-05-22
Payer: COMMERCIAL

## 2024-05-22 DIAGNOSIS — Z98.1 S/P LUMBAR SPINAL FUSION: ICD-10-CM

## 2024-05-22 DIAGNOSIS — Z98.1 S/P FUSION OF THORACIC SPINE: ICD-10-CM

## 2024-05-22 NOTE — TELEPHONE ENCOUNTER
Talked to Pt and she said she is still having headaches and is nauseated. She has an appointment with Dr. Whelan tomorrow. I told her if she feels to bad to come to the ER. If she feels she can wait until tomorrow she will see him. I asked her to get the xray before she comes.

## 2024-05-23 ENCOUNTER — HOSPITAL ENCOUNTER (OUTPATIENT)
Dept: RADIOLOGY | Facility: HOSPITAL | Age: 74
Discharge: HOME | DRG: 093 | End: 2024-05-23
Payer: COMMERCIAL

## 2024-05-23 ENCOUNTER — HOSPITAL ENCOUNTER (INPATIENT)
Facility: HOSPITAL | Age: 74
LOS: 2 days | Discharge: HOME | DRG: 093 | End: 2024-05-25
Attending: NEUROLOGICAL SURGERY | Admitting: NEUROLOGICAL SURGERY
Payer: COMMERCIAL

## 2024-05-23 ENCOUNTER — OFFICE VISIT (OUTPATIENT)
Dept: NEUROSURGERY | Facility: CLINIC | Age: 74
End: 2024-05-23
Payer: COMMERCIAL

## 2024-05-23 ENCOUNTER — APPOINTMENT (OUTPATIENT)
Dept: RADIOLOGY | Facility: HOSPITAL | Age: 74
DRG: 093 | End: 2024-05-23
Payer: COMMERCIAL

## 2024-05-23 ENCOUNTER — CLINICAL SUPPORT (OUTPATIENT)
Dept: EMERGENCY MEDICINE | Facility: HOSPITAL | Age: 74
DRG: 093 | End: 2024-05-23
Payer: COMMERCIAL

## 2024-05-23 VITALS
HEIGHT: 63 IN | BODY MASS INDEX: 20.02 KG/M2 | WEIGHT: 113 LBS | DIASTOLIC BLOOD PRESSURE: 92 MMHG | RESPIRATION RATE: 18 BRPM | SYSTOLIC BLOOD PRESSURE: 158 MMHG | HEART RATE: 82 BPM

## 2024-05-23 DIAGNOSIS — Z98.1 S/P LUMBAR SPINAL FUSION: ICD-10-CM

## 2024-05-23 DIAGNOSIS — R51.9 HEADACHE: Primary | ICD-10-CM

## 2024-05-23 DIAGNOSIS — R51.9 GENERALIZED HEADACHES: ICD-10-CM

## 2024-05-23 DIAGNOSIS — Z98.1 S/P FUSION OF THORACIC SPINE: ICD-10-CM

## 2024-05-23 DIAGNOSIS — M48.061 LUMBAR FORAMINAL STENOSIS: ICD-10-CM

## 2024-05-23 DIAGNOSIS — G96.00 CSF LEAK: ICD-10-CM

## 2024-05-23 LAB
ABO GROUP (TYPE) IN BLOOD: NORMAL
ABO GROUP (TYPE) IN BLOOD: NORMAL
ALBUMIN SERPL BCP-MCNC: 4.3 G/DL (ref 3.4–5)
ALBUMIN SERPL BCP-MCNC: 4.6 G/DL (ref 3.4–5)
ALP SERPL-CCNC: 145 U/L (ref 33–136)
ALT SERPL W P-5'-P-CCNC: 15 U/L (ref 7–45)
ANION GAP SERPL CALC-SCNC: 18 MMOL/L (ref 10–20)
ANION GAP SERPL CALC-SCNC: 19 MMOL/L (ref 10–20)
ANTIBODY SCREEN: NORMAL
ANTIBODY SCREEN: NORMAL
APTT PPP: 32 SECONDS (ref 27–38)
AST SERPL W P-5'-P-CCNC: 19 U/L (ref 9–39)
ATRIAL RATE: 80 BPM
BASOPHILS # BLD AUTO: 0.05 X10*3/UL (ref 0–0.1)
BASOPHILS NFR BLD AUTO: 0.5 %
BILIRUB SERPL-MCNC: 0.7 MG/DL (ref 0–1.2)
BUN SERPL-MCNC: 14 MG/DL (ref 6–23)
BUN SERPL-MCNC: 14 MG/DL (ref 6–23)
CALCIUM SERPL-MCNC: 10.1 MG/DL (ref 8.6–10.6)
CALCIUM SERPL-MCNC: 9.5 MG/DL (ref 8.6–10.6)
CHLORIDE SERPL-SCNC: 101 MMOL/L (ref 98–107)
CHLORIDE SERPL-SCNC: 101 MMOL/L (ref 98–107)
CO2 SERPL-SCNC: 27 MMOL/L (ref 21–32)
CO2 SERPL-SCNC: 28 MMOL/L (ref 21–32)
CREAT SERPL-MCNC: 0.48 MG/DL (ref 0.5–1.05)
CREAT SERPL-MCNC: 0.57 MG/DL (ref 0.5–1.05)
EGFRCR SERPLBLD CKD-EPI 2021: >90 ML/MIN/1.73M*2
EGFRCR SERPLBLD CKD-EPI 2021: >90 ML/MIN/1.73M*2
EOSINOPHIL # BLD AUTO: 0.03 X10*3/UL (ref 0–0.4)
EOSINOPHIL NFR BLD AUTO: 0.3 %
ERYTHROCYTE [DISTWIDTH] IN BLOOD BY AUTOMATED COUNT: 13.8 % (ref 11.5–14.5)
ERYTHROCYTE [DISTWIDTH] IN BLOOD BY AUTOMATED COUNT: 14.1 % (ref 11.5–14.5)
GLUCOSE SERPL-MCNC: 105 MG/DL (ref 74–99)
GLUCOSE SERPL-MCNC: 88 MG/DL (ref 74–99)
HCT VFR BLD AUTO: 39.1 % (ref 36–46)
HCT VFR BLD AUTO: 40.5 % (ref 36–46)
HGB BLD-MCNC: 13 G/DL (ref 12–16)
HGB BLD-MCNC: 13.4 G/DL (ref 12–16)
IMM GRANULOCYTES # BLD AUTO: 0.03 X10*3/UL (ref 0–0.5)
IMM GRANULOCYTES NFR BLD AUTO: 0.3 % (ref 0–0.9)
INR PPP: 1.1 (ref 0.9–1.1)
LYMPHOCYTES # BLD AUTO: 0.78 X10*3/UL (ref 0.8–3)
LYMPHOCYTES NFR BLD AUTO: 8.5 %
MCH RBC QN AUTO: 30.6 PG (ref 26–34)
MCH RBC QN AUTO: 30.7 PG (ref 26–34)
MCHC RBC AUTO-ENTMCNC: 33.1 G/DL (ref 32–36)
MCHC RBC AUTO-ENTMCNC: 33.2 G/DL (ref 32–36)
MCV RBC AUTO: 92 FL (ref 80–100)
MCV RBC AUTO: 93 FL (ref 80–100)
MONOCYTES # BLD AUTO: 0.73 X10*3/UL (ref 0.05–0.8)
MONOCYTES NFR BLD AUTO: 7.9 %
MUCOUS THREADS #/AREA URNS AUTO: ABNORMAL /LPF
NEUTROPHILS # BLD AUTO: 7.59 X10*3/UL (ref 1.6–5.5)
NEUTROPHILS NFR BLD AUTO: 82.5 %
NRBC BLD-RTO: 0 /100 WBCS (ref 0–0)
NRBC BLD-RTO: 0 /100 WBCS (ref 0–0)
P AXIS: 82 DEGREES
P OFFSET: 185 MS
P ONSET: 132 MS
PHOSPHATE SERPL-MCNC: 2.8 MG/DL (ref 2.5–4.9)
PLATELET # BLD AUTO: 452 X10*3/UL (ref 150–450)
PLATELET # BLD AUTO: 468 X10*3/UL (ref 150–450)
POTASSIUM SERPL-SCNC: 3.7 MMOL/L (ref 3.5–5.3)
POTASSIUM SERPL-SCNC: 3.9 MMOL/L (ref 3.5–5.3)
PR INTERVAL: 176 MS
PROT SERPL-MCNC: 7.6 G/DL (ref 6.4–8.2)
PROTHROMBIN TIME: 12.9 SECONDS (ref 9.8–12.8)
Q ONSET: 220 MS
QRS COUNT: 13 BEATS
QRS DURATION: 122 MS
QT INTERVAL: 406 MS
QTC CALCULATION(BAZETT): 468 MS
QTC FREDERICIA: 447 MS
R AXIS: 70 DEGREES
RBC # BLD AUTO: 4.24 X10*6/UL (ref 4–5.2)
RBC # BLD AUTO: 4.38 X10*6/UL (ref 4–5.2)
RBC #/AREA URNS AUTO: ABNORMAL /HPF
RH FACTOR (ANTIGEN D): NORMAL
RH FACTOR (ANTIGEN D): NORMAL
SODIUM SERPL-SCNC: 143 MMOL/L (ref 136–145)
SODIUM SERPL-SCNC: 143 MMOL/L (ref 136–145)
T AXIS: 75 DEGREES
T OFFSET: 423 MS
VENTRICULAR RATE: 80 BPM
WBC # BLD AUTO: 12.9 X10*3/UL (ref 4.4–11.3)
WBC # BLD AUTO: 9.2 X10*3/UL (ref 4.4–11.3)
WBC #/AREA URNS AUTO: ABNORMAL /HPF

## 2024-05-23 PROCEDURE — 70496 CT ANGIOGRAPHY HEAD: CPT

## 2024-05-23 PROCEDURE — 72082 X-RAY EXAM ENTIRE SPI 2/3 VW: CPT

## 2024-05-23 PROCEDURE — 85025 COMPLETE CBC W/AUTO DIFF WBC: CPT | Performed by: NEUROLOGICAL SURGERY

## 2024-05-23 PROCEDURE — 1125F AMNT PAIN NOTED PAIN PRSNT: CPT | Performed by: NEUROLOGICAL SURGERY

## 2024-05-23 PROCEDURE — 86901 BLOOD TYPING SEROLOGIC RH(D): CPT

## 2024-05-23 PROCEDURE — 2500000004 HC RX 250 GENERAL PHARMACY W/ HCPCS (ALT 636 FOR OP/ED)

## 2024-05-23 PROCEDURE — 99285 EMERGENCY DEPT VISIT HI MDM: CPT | Performed by: EMERGENCY MEDICINE

## 2024-05-23 PROCEDURE — 99285 EMERGENCY DEPT VISIT HI MDM: CPT

## 2024-05-23 PROCEDURE — 81001 URINALYSIS AUTO W/SCOPE: CPT | Performed by: STUDENT IN AN ORGANIZED HEALTH CARE EDUCATION/TRAINING PROGRAM

## 2024-05-23 PROCEDURE — 85025 COMPLETE CBC W/AUTO DIFF WBC: CPT | Performed by: EMERGENCY MEDICINE

## 2024-05-23 PROCEDURE — 70553 MRI BRAIN STEM W/O & W/DYE: CPT | Performed by: RADIOLOGY

## 2024-05-23 PROCEDURE — 80069 RENAL FUNCTION PANEL: CPT | Mod: CCI | Performed by: STUDENT IN AN ORGANIZED HEALTH CARE EDUCATION/TRAINING PROGRAM

## 2024-05-23 PROCEDURE — 99024 POSTOP FOLLOW-UP VISIT: CPT | Performed by: NEUROLOGICAL SURGERY

## 2024-05-23 PROCEDURE — 85027 COMPLETE CBC AUTOMATED: CPT | Performed by: STUDENT IN AN ORGANIZED HEALTH CARE EDUCATION/TRAINING PROGRAM

## 2024-05-23 PROCEDURE — 2500000004 HC RX 250 GENERAL PHARMACY W/ HCPCS (ALT 636 FOR OP/ED): Performed by: STUDENT IN AN ORGANIZED HEALTH CARE EDUCATION/TRAINING PROGRAM

## 2024-05-23 PROCEDURE — 80053 COMPREHEN METABOLIC PANEL: CPT | Performed by: EMERGENCY MEDICINE

## 2024-05-23 PROCEDURE — 72158 MRI LUMBAR SPINE W/O & W/DYE: CPT

## 2024-05-23 PROCEDURE — 1100000001 HC PRIVATE ROOM DAILY

## 2024-05-23 PROCEDURE — 86901 BLOOD TYPING SEROLOGIC RH(D): CPT | Performed by: STUDENT IN AN ORGANIZED HEALTH CARE EDUCATION/TRAINING PROGRAM

## 2024-05-23 PROCEDURE — 80053 COMPREHEN METABOLIC PANEL: CPT | Performed by: NEUROLOGICAL SURGERY

## 2024-05-23 PROCEDURE — 70496 CT ANGIOGRAPHY HEAD: CPT | Performed by: RADIOLOGY

## 2024-05-23 PROCEDURE — 85610 PROTHROMBIN TIME: CPT

## 2024-05-23 PROCEDURE — 36415 COLL VENOUS BLD VENIPUNCTURE: CPT | Performed by: EMERGENCY MEDICINE

## 2024-05-23 PROCEDURE — 1036F TOBACCO NON-USER: CPT | Performed by: NEUROLOGICAL SURGERY

## 2024-05-23 PROCEDURE — A9575 INJ GADOTERATE MEGLUMI 0.1ML: HCPCS | Performed by: NEUROLOGICAL SURGERY

## 2024-05-23 PROCEDURE — 70553 MRI BRAIN STEM W/O & W/DYE: CPT

## 2024-05-23 PROCEDURE — 72158 MRI LUMBAR SPINE W/O & W/DYE: CPT | Performed by: RADIOLOGY

## 2024-05-23 PROCEDURE — 1159F MED LIST DOCD IN RCRD: CPT | Performed by: NEUROLOGICAL SURGERY

## 2024-05-23 PROCEDURE — 93005 ELECTROCARDIOGRAM TRACING: CPT

## 2024-05-23 PROCEDURE — 72082 X-RAY EXAM ENTIRE SPI 2/3 VW: CPT | Performed by: STUDENT IN AN ORGANIZED HEALTH CARE EDUCATION/TRAINING PROGRAM

## 2024-05-23 PROCEDURE — 2550000001 HC RX 255 CONTRASTS: Performed by: NEUROLOGICAL SURGERY

## 2024-05-23 PROCEDURE — 36415 COLL VENOUS BLD VENIPUNCTURE: CPT

## 2024-05-23 RX ORDER — ACETAMINOPHEN 325 MG/1
650 TABLET ORAL EVERY 6 HOURS PRN
Status: DISCONTINUED | OUTPATIENT
Start: 2024-05-23 | End: 2024-05-25 | Stop reason: HOSPADM

## 2024-05-23 RX ORDER — ONDANSETRON 4 MG/1
4 TABLET, ORALLY DISINTEGRATING ORAL EVERY 8 HOURS PRN
Qty: 90 TABLET | Refills: 0 | Status: SHIPPED | OUTPATIENT
Start: 2024-05-23 | End: 2024-06-22

## 2024-05-23 RX ORDER — GADOTERATE MEGLUMINE 376.9 MG/ML
10 INJECTION INTRAVENOUS
Status: COMPLETED | OUTPATIENT
Start: 2024-05-23 | End: 2024-05-23

## 2024-05-23 RX ORDER — NALOXONE HYDROCHLORIDE 0.4 MG/ML
0.2 INJECTION, SOLUTION INTRAMUSCULAR; INTRAVENOUS; SUBCUTANEOUS EVERY 5 MIN PRN
Status: DISCONTINUED | OUTPATIENT
Start: 2024-05-23 | End: 2024-05-25 | Stop reason: HOSPADM

## 2024-05-23 RX ORDER — BRIMONIDINE TARTRATE 2 MG/ML
1 SOLUTION/ DROPS OPHTHALMIC 2 TIMES DAILY
Status: DISCONTINUED | OUTPATIENT
Start: 2024-05-23 | End: 2024-05-25 | Stop reason: HOSPADM

## 2024-05-23 RX ORDER — DIPHENHYDRAMINE HYDROCHLORIDE 50 MG/ML
50 INJECTION INTRAMUSCULAR; INTRAVENOUS ONCE
Status: COMPLETED | OUTPATIENT
Start: 2024-05-23 | End: 2024-05-23

## 2024-05-23 RX ORDER — ONDANSETRON HYDROCHLORIDE 2 MG/ML
4 INJECTION, SOLUTION INTRAVENOUS EVERY 8 HOURS PRN
Status: DISCONTINUED | OUTPATIENT
Start: 2024-05-23 | End: 2024-05-25 | Stop reason: HOSPADM

## 2024-05-23 RX ORDER — ATORVASTATIN CALCIUM 10 MG/1
10 TABLET, FILM COATED ORAL NIGHTLY
Status: DISCONTINUED | OUTPATIENT
Start: 2024-05-23 | End: 2024-05-25 | Stop reason: HOSPADM

## 2024-05-23 RX ORDER — OXYCODONE HYDROCHLORIDE 5 MG/1
2.5 TABLET ORAL EVERY 4 HOURS PRN
Status: DISCONTINUED | OUTPATIENT
Start: 2024-05-23 | End: 2024-05-25 | Stop reason: HOSPADM

## 2024-05-23 RX ORDER — AMOXICILLIN 250 MG
2 CAPSULE ORAL 2 TIMES DAILY
Status: DISCONTINUED | OUTPATIENT
Start: 2024-05-23 | End: 2024-05-25 | Stop reason: HOSPADM

## 2024-05-23 RX ORDER — LOSARTAN POTASSIUM 50 MG/1
50 TABLET ORAL DAILY
Status: DISCONTINUED | OUTPATIENT
Start: 2024-05-23 | End: 2024-05-25 | Stop reason: HOSPADM

## 2024-05-23 RX ORDER — PANTOPRAZOLE SODIUM 40 MG/1
40 TABLET, DELAYED RELEASE ORAL
Status: DISCONTINUED | OUTPATIENT
Start: 2024-05-24 | End: 2024-05-25 | Stop reason: HOSPADM

## 2024-05-23 RX ORDER — MAGNESIUM SULFATE HEPTAHYDRATE 40 MG/ML
2 INJECTION, SOLUTION INTRAVENOUS ONCE
Status: COMPLETED | OUTPATIENT
Start: 2024-05-23 | End: 2024-05-23

## 2024-05-23 RX ORDER — POLYETHYLENE GLYCOL 3350 17 G/17G
17 POWDER, FOR SOLUTION ORAL DAILY
Status: DISCONTINUED | OUTPATIENT
Start: 2024-05-23 | End: 2024-05-25 | Stop reason: HOSPADM

## 2024-05-23 RX ORDER — TIMOLOL MALEATE 5 MG/ML
1 SOLUTION/ DROPS OPHTHALMIC 2 TIMES DAILY
Status: DISCONTINUED | OUTPATIENT
Start: 2024-05-23 | End: 2024-05-25 | Stop reason: HOSPADM

## 2024-05-23 RX ORDER — HEPARIN SODIUM 5000 [USP'U]/ML
5000 INJECTION, SOLUTION INTRAVENOUS; SUBCUTANEOUS EVERY 8 HOURS SCHEDULED
Status: DISCONTINUED | OUTPATIENT
Start: 2024-05-23 | End: 2024-05-25 | Stop reason: HOSPADM

## 2024-05-23 RX ORDER — ONDANSETRON HYDROCHLORIDE 2 MG/ML
4 INJECTION, SOLUTION INTRAVENOUS ONCE
Status: COMPLETED | OUTPATIENT
Start: 2024-05-23 | End: 2024-05-23

## 2024-05-23 RX ORDER — DEXAMETHASONE SODIUM PHOSPHATE 100 MG/10ML
10 INJECTION INTRAMUSCULAR; INTRAVENOUS ONCE
Status: COMPLETED | OUTPATIENT
Start: 2024-05-23 | End: 2024-05-23

## 2024-05-23 RX ORDER — ONDANSETRON 4 MG/1
4 TABLET, FILM COATED ORAL EVERY 8 HOURS PRN
Status: DISCONTINUED | OUTPATIENT
Start: 2024-05-23 | End: 2024-05-25 | Stop reason: HOSPADM

## 2024-05-23 RX ORDER — OXYCODONE HYDROCHLORIDE 5 MG/1
5 TABLET ORAL EVERY 4 HOURS PRN
Status: DISCONTINUED | OUTPATIENT
Start: 2024-05-23 | End: 2024-05-25 | Stop reason: HOSPADM

## 2024-05-23 RX ORDER — BRIMONIDINE TARTRATE AND TIMOLOL MALEATE 2; 5 MG/ML; MG/ML
1 SOLUTION OPHTHALMIC EVERY 12 HOURS SCHEDULED
Status: DISCONTINUED | OUTPATIENT
Start: 2024-05-23 | End: 2024-05-23 | Stop reason: WASHOUT

## 2024-05-23 RX ORDER — SODIUM CHLORIDE 9 MG/ML
75 INJECTION, SOLUTION INTRAVENOUS CONTINUOUS
Status: DISCONTINUED | OUTPATIENT
Start: 2024-05-23 | End: 2024-05-25 | Stop reason: HOSPADM

## 2024-05-23 RX ORDER — ONDANSETRON HYDROCHLORIDE 2 MG/ML
INJECTION, SOLUTION INTRAVENOUS
Status: COMPLETED
Start: 2024-05-23 | End: 2024-05-23

## 2024-05-23 RX ADMIN — GADOTERATE MEGLUMINE 10 ML: 376.9 INJECTION INTRAVENOUS at 23:36

## 2024-05-23 RX ADMIN — MAGNESIUM SULFATE HEPTAHYDRATE 2 G: 40 INJECTION, SOLUTION INTRAVENOUS at 18:04

## 2024-05-23 RX ADMIN — DIPHENHYDRAMINE HYDROCHLORIDE 50 MG: 50 INJECTION, SOLUTION INTRAMUSCULAR; INTRAVENOUS at 18:07

## 2024-05-23 RX ADMIN — ONDANSETRON HYDROCHLORIDE 4 MG: 2 INJECTION, SOLUTION INTRAVENOUS at 12:56

## 2024-05-23 RX ADMIN — ONDANSETRON 4 MG: 2 INJECTION INTRAMUSCULAR; INTRAVENOUS at 18:07

## 2024-05-23 RX ADMIN — ONDANSETRON 4 MG: 2 INJECTION INTRAMUSCULAR; INTRAVENOUS at 12:56

## 2024-05-23 RX ADMIN — SODIUM CHLORIDE 75 ML/HR: 9 INJECTION, SOLUTION INTRAVENOUS at 18:05

## 2024-05-23 RX ADMIN — DEXAMETHASONE SODIUM PHOSPHATE 10 MG: 10 INJECTION INTRAMUSCULAR; INTRAVENOUS at 18:07

## 2024-05-23 RX ADMIN — IOHEXOL 80 ML: 350 INJECTION, SOLUTION INTRAVENOUS at 18:46

## 2024-05-23 ASSESSMENT — LIFESTYLE VARIABLES
HAVE PEOPLE ANNOYED YOU BY CRITICIZING YOUR DRINKING: NO
EVER HAD A DRINK FIRST THING IN THE MORNING TO STEADY YOUR NERVES TO GET RID OF A HANGOVER: NO
EVER FELT BAD OR GUILTY ABOUT YOUR DRINKING: NO
HAVE YOU EVER FELT YOU SHOULD CUT DOWN ON YOUR DRINKING: NO
TOTAL SCORE: 0

## 2024-05-23 ASSESSMENT — COGNITIVE AND FUNCTIONAL STATUS - GENERAL
DAILY ACTIVITIY SCORE: 24
MOBILITY SCORE: 24

## 2024-05-23 ASSESSMENT — COLUMBIA-SUICIDE SEVERITY RATING SCALE - C-SSRS
1. IN THE PAST MONTH, HAVE YOU WISHED YOU WERE DEAD OR WISHED YOU COULD GO TO SLEEP AND NOT WAKE UP?: NO
2. HAVE YOU ACTUALLY HAD ANY THOUGHTS OF KILLING YOURSELF?: NO
6. HAVE YOU EVER DONE ANYTHING, STARTED TO DO ANYTHING, OR PREPARED TO DO ANYTHING TO END YOUR LIFE?: NO

## 2024-05-23 ASSESSMENT — PAIN - FUNCTIONAL ASSESSMENT
PAIN_FUNCTIONAL_ASSESSMENT: 0-10
PAIN_FUNCTIONAL_ASSESSMENT: 0-10

## 2024-05-23 ASSESSMENT — PAIN SCALES - GENERAL
PAINLEVEL_OUTOF10: 0 - NO PAIN
PAINLEVEL: 10-WORST PAIN EVER
PAINLEVEL_OUTOF10: 0 - NO PAIN

## 2024-05-23 NOTE — ED TRIAGE NOTES
Sent for CT. States she had spinal surgery 6 weeks ago and has been having nausea and headaches since

## 2024-05-23 NOTE — H&P
History Of Present Illness  Alysa Mcnair is a 73 y.o. female presenting with 73F h/o GERD, HLD, fibromyalgia, HTN, severe postop nausea, coronal scoliosis,  s/p T10  - pelvis instrumentation, L1-S1 PCOs, L4/5, L5/S1 TLIF, durotomy primarily repaired,  drain auto dc'd with stitch, p/w worsening HA. Since surgery she has had on and off headaches that have worsened recently. Denies new weakness, numbness tingling.      Past Medical History  Past Medical History:   Diagnosis Date    Arthritis     Back pain     Right L3-L4 Transforaminal Epidural Steroid injection under fluoroscopic guidance on 23    Chronic pain disorder     Corneal abrasion     Degenerative scoliosis     Fibromyalgia, primary     controlled on mobic    GERD (gastroesophageal reflux disease)     managed with protonix    History of shingles     Hyperlipidemia     f/w pcp, managed with Lipitor    Hypertension     f/w pcp, not currently on medication    Nephrolithiasis     passed naturally    PONV (postoperative nausea and vomiting)     Radiculopathy     Scheduled for surgery with Dr. Whelan on 24    Retinal detachment     Shingles     Spinal stenosis     Tinnitus of left ear     Vertigo     Vision loss     left eye d/t injury       Surgical History  Past Surgical History:   Procedure Laterality Date    ADENOIDECTOMY       SECTION, LOW TRANSVERSE      x3 , ,     EYE SURGERY Left     pupil repair    FOOT Right     HYSTERECTOMY      TONSILLECTOMY          Social History  She reports that she has never smoked. She has never used smokeless tobacco. She reports that she does not currently use alcohol. She reports that she does not use drugs.    Family History  Family History   Problem Relation Name Age of Onset    No Known Problems Mother      Heart disease Father          Allergies  Sulfa (sulfonamide antibiotics)    Review of Systems   All other systems reviewed and are negative.       Physical Exam  HENT:      Head:  "Normocephalic.      Right Ear: Tympanic membrane normal.   Eyes:      Pupils: Pupils are equal, round, and reactive to light.   Cardiovascular:      Rate and Rhythm: Normal rate.   Musculoskeletal:         General: Normal range of motion.      Cervical back: Normal range of motion.   Skin:     General: Skin is warm.   Neurological:      Mental Status: She is alert.      Comments: NAD, A&Ox3  Cranial Nerves II-XII: PERRL, EOMI, Face symmetric, Facial SILT, Palate/Tongue midline and symmetric, shoulder shrugs symmetric, hearing intact to finger rubs bilaterally  Motor: 5/5, no dysmetria on finger to nose, no pronator drift  Sensation: SILT throughout all extremities  DTRS: 2+ Throughout, No Hoffmans or Clonus    Psychiatric:         Mood and Affect: Mood normal.          Last Recorded Vitals  Blood pressure (!) 180/93, pulse 80, temperature 36.9 °C (98.5 °F), resp. rate 18, height 1.6 m (5' 3\"), weight 51.3 kg (113 lb), SpO2 97%.    Relevant Results      No imaging to review       Assessment/Plan   Principal Problem:    Headache      h/o GERD, HLD, fibromyalgia, HTN, severe postop nausea, coronal scoliosis, 4/12 s/p T10  - pelvis instrumentation, L1-S1 PCOs, L4/5, L5/S1 TLIF, durotomy primarily repaired, 4/14 drain auto dc'd with stitch, p/w worsening HA    ASSESSMENt  Admit T4 floor  CTH without contrast to rule out intracrnial hemorrhage  MRI LS with and without contrast to screen for CSF collection  NPO at midnight for possible IR guided blood patch  Migraine cocktail  Home meds, SCDs, SQH           Osvaldo Doty MD    "

## 2024-05-23 NOTE — ED PROVIDER NOTES
CC: Headache and Vomiting     History provided by: Patient  Limitations to History: None    HPI:  Ashley Mcnair is a 73 y.o. F with PMH of GERD, HTN, HLD, fibromyalia, osteoarthritis and recent t10-l1 spinal fusion (4/10/24) who presented to the ED with a one week history of headache, nausea and vomiting. Patient notes that she had a spinal fusion 6 weeks ago and subsequently had a headache and poor appetite which self resolved about two weeks post-op. Within the past week, patient reports a new 9/10 headache which is triggered by coughing or sitting up and improved by Tylenol and laying down. States that the headache is mainly localized to her mid forehead region and denies any photophobia or phonophobia. During the week, she states that she has also had some dizziness, difficulty with balance and tinnitus. She notes that she has also been having mild hearing loss and has had to turn the tv up to a higher volume all week. Denies any blurry vision, lightheadedness, fever, chills, abdominal pain. chest pain, SOB, recent infection, or palpitations.     Records Reviewed: 5/23/24 neurosurgery note.   ???????????????????????????????????????????????????????????????  Triage Vitals:  T 36.9 °C (98.5 °F)  HR 80  BP (!) 180/93  RR 18  O2 97 % None (Room air)    Vital signs reviewed in nursing triage note, EMR flow sheets, and at patient's bedside.   General: Awake, alert, in no acute distress  Eyes: Gaze conjugate.  No scleral icterus or injection  HENT: Normo-cephalic, atraumatic. No stridor. No rhinorrhea or epistaxis.  CV: Regular rhythm. No murmurs appreciated. Radial pulses 2+ bilaterally  Respiratory: Breathing non-labored, speaking in full sentences.  Clear to auscultation bilaterally  GI: Soft, non-distended, non-tender. No rebound or guarding.  MSK/Extremities: No gross bony deformities. Moving all extremities. No lower extremity edema. No nuchal rigidity.  Skin: Warm. Appropriate color  Neuro: Alert.  Oriented. Face symmetric. Speech is fluent.  Gross strength and sensation intact in b/l UE and LEs  Psych: Appropriate mood and affect   ???????????????????????????????????????????????????????????????  ED Course/Treatment/Medical Decision Making  MDM:  Ashley Mcnair is a 73 y.o. F with a past medical history of GERD, HTN, HLD, fibromyalia, osteoarthritis and recent t10-l1 spinal fusion (4/10/24) that presents with symptoms most consistent with spinal epidural headache.  Upon arrival to the emergency department the patient was hypertensive to 180/93, but was otherwise vitally stable.  Patient reports that she forgot to take her bp meds this AM. The patient was stable and comfortable appearing without nystagmus, nuchal rigidity, or focal neurologic deficits. Appropriate labs and imaging have been ordered.  CBC/CMP were unremarkable without leukocytosis or electrolyte abnormalities. CTH/CT angio pending. The patient was given Zofran for her nausea with subsequent improvement in nausea. Patient will be admitted to the neurosurgery team for further workup.    Differential diagnoses considered include but are not limited to: meningitis vs spinal epidural headache vs meniere vs post-op infection vs dural venous thrombosis      ED Course:  ED Course as of 05/25/24 0129   Fri May 24, 2024   0131 Resident supervision attestation.  I saw this patient with the resident.  I agree with the following, in brief, this is a 73-year-old female with a recent history of Recent T10 S1 fusion on 4/12 by neurosurgery, presenting to the emergency department per recommendation of her neurosurgeon outpatient after she endorsed 1 week of worsening headaches and nausea, vomiting.  She endorses to me today that she has been having mild frontal throbbing headache since the surgery 6 weeks ago however severity has worsened over the past 1 week.  She denies any trauma, does endorse persistent nausea, to the point of weight loss over the past  week due to poor p.o. intake.  Denies any fevers, neck pain, drainage from incision site, abdominal pain, chest pain or shortness of breath.  On exam, she is well-appearing, mentating well, hemodynamically stable.  Incision wound does appear to be clean, dry, intact, no purulence or discharge appreciated.  She does endorse some paresthesias in her left lower extremity which have been present since the surgery, states that they are stable, not worsening, she denies any additional findings.  Aside from mild sensation deficit to distal left lower extremity, she is neurologically intact without any focal deficits.  She does endorse headache is positional, worse with sitting up, may be related to spinal leak versus mass versus bleed versus clot.  Will plan on CTV, neurosurgery consult.  CTA head shows concern for 0.8 cm focus of hyperdensity in the left subinsular region concerning for hemorrhage, this was communicated with the neurosurgery team accepted the patient for admission, currently has no additional recommendations aside from MRIs of the lumbar spine and brain.  She was given Zofran with improvement in her nausea, denying any additional pain medication at this time.  She admitted to neurosurgery for further care, MRIs are pending upon signout. [SB]      ED Course User Index  [SB] Edward Jeffrey DO         Diagnoses as of 05/25/24 0129   Headache       Social Determinants Limiting Care:      Disposition:  Patient will be admitted to the neurosurgery team for further workup.    Assessment and plan discussed with Dr. Romina Antonio MD   Procedures ? SmartLinks last updated 5/25/2024 1:29 AM        Sepideh Mercado MD  Resident  05/23/24 1730       Nancy Antonio MD  05/25/24 0133

## 2024-05-24 LAB — HOLD SPECIMEN: NORMAL

## 2024-05-24 PROCEDURE — 1100000001 HC PRIVATE ROOM DAILY

## 2024-05-24 PROCEDURE — 2500000004 HC RX 250 GENERAL PHARMACY W/ HCPCS (ALT 636 FOR OP/ED): Performed by: STUDENT IN AN ORGANIZED HEALTH CARE EDUCATION/TRAINING PROGRAM

## 2024-05-24 PROCEDURE — 2500000001 HC RX 250 WO HCPCS SELF ADMINISTERED DRUGS (ALT 637 FOR MEDICARE OP): Performed by: STUDENT IN AN ORGANIZED HEALTH CARE EDUCATION/TRAINING PROGRAM

## 2024-05-24 RX ORDER — MELOXICAM 7.5 MG/1
7.5 TABLET ORAL 2 TIMES DAILY
Status: ON HOLD | COMMUNITY
End: 2024-05-24 | Stop reason: WASHOUT

## 2024-05-24 RX ADMIN — BRIMONIDINE TARTRATE 1 DROP: 2 SOLUTION/ DROPS OPHTHALMIC at 20:25

## 2024-05-24 RX ADMIN — TIMOLOL MALEATE 1 DROP: 5 SOLUTION/ DROPS OPHTHALMIC at 08:43

## 2024-05-24 RX ADMIN — SODIUM CHLORIDE 75 ML/HR: 9 INJECTION, SOLUTION INTRAVENOUS at 05:24

## 2024-05-24 RX ADMIN — ATORVASTATIN CALCIUM 10 MG: 10 TABLET, FILM COATED ORAL at 00:09

## 2024-05-24 RX ADMIN — ONDANSETRON 4 MG: 2 INJECTION INTRAMUSCULAR; INTRAVENOUS at 07:15

## 2024-05-24 RX ADMIN — LOSARTAN POTASSIUM 50 MG: 50 TABLET, FILM COATED ORAL at 00:09

## 2024-05-24 RX ADMIN — ACETAMINOPHEN 650 MG: 325 TABLET ORAL at 20:25

## 2024-05-24 RX ADMIN — ACETAMINOPHEN 650 MG: 325 TABLET ORAL at 00:09

## 2024-05-24 RX ADMIN — TIMOLOL MALEATE 1 DROP: 5 SOLUTION/ DROPS OPHTHALMIC at 20:26

## 2024-05-24 RX ADMIN — HEPARIN SODIUM 5000 UNITS: 5000 INJECTION INTRAVENOUS; SUBCUTANEOUS at 05:24

## 2024-05-24 RX ADMIN — ONDANSETRON 4 MG: 2 INJECTION INTRAMUSCULAR; INTRAVENOUS at 15:39

## 2024-05-24 RX ADMIN — ATORVASTATIN CALCIUM 10 MG: 10 TABLET, FILM COATED ORAL at 20:25

## 2024-05-24 ASSESSMENT — ACTIVITIES OF DAILY LIVING (ADL)
TOILETING: INDEPENDENT
BATHING: INDEPENDENT
GROOMING: INDEPENDENT
FEEDING YOURSELF: INDEPENDENT
ADEQUATE_TO_COMPLETE_ADL: YES
JUDGMENT_ADEQUATE_SAFELY_COMPLETE_DAILY_ACTIVITIES: YES
PATIENT'S MEMORY ADEQUATE TO SAFELY COMPLETE DAILY ACTIVITIES?: YES
ASSISTIVE_DEVICE: EYEGLASSES
HEARING - LEFT EAR: FUNCTIONAL
DRESSING YOURSELF: INDEPENDENT
HEARING - RIGHT EAR: FUNCTIONAL
WALKS IN HOME: INDEPENDENT

## 2024-05-24 ASSESSMENT — COGNITIVE AND FUNCTIONAL STATUS - GENERAL
PATIENT BASELINE BEDBOUND: NO
DAILY ACTIVITIY SCORE: 24
MOBILITY SCORE: 24
DAILY ACTIVITIY SCORE: 24
DAILY ACTIVITIY SCORE: 24
MOBILITY SCORE: 24
DAILY ACTIVITIY SCORE: 24
MOBILITY SCORE: 24
DAILY ACTIVITIY SCORE: 24
MOBILITY SCORE: 24

## 2024-05-24 ASSESSMENT — PAIN SCALES - GENERAL
PAINLEVEL_OUTOF10: 4
PAINLEVEL_OUTOF10: 0 - NO PAIN
PAINLEVEL_OUTOF10: 5 - MODERATE PAIN
PAINLEVEL_OUTOF10: 0 - NO PAIN
PAINLEVEL_OUTOF10: 0 - NO PAIN

## 2024-05-24 ASSESSMENT — PAIN - FUNCTIONAL ASSESSMENT
PAIN_FUNCTIONAL_ASSESSMENT: 0-10

## 2024-05-24 ASSESSMENT — PAIN DESCRIPTION - LOCATION
LOCATION: HEAD
LOCATION: HEAD

## 2024-05-24 NOTE — CONSULTS
"Nutrition Note:   Nutrition Assessment    Reason for Assessment: Admission nursing screening (MST of 2)    72 y/o F w/ h/o GERD, HLD, fibromyalgia, HTN, severe postop nausea, coronal scoliosis, 4/12 s/p T10  - pelvis instrumentation, L1-S1 PCOs, L4/5, L5/S1 TLIF, durotomy primarily repaired, 4/14 drain auto dc'd with stitch, p/w progressive positional headache.     NPO this morning pending procedure. Pt reports she hopes to be going home today,       Nutrition History:  Energy Intake: Fair 50-75 %  Food and Nutrient History: Pt reports reduced appetite since surgery ~6 weeks ago but supplementing with protein drinks 2-3x/d (\"white bottle with cow on it\" > suspect fairlife). Very poor intakes only over the past ~3 days with worsening symptoms.  Vitamin/Herbal Supplement Use: not taking recently due to N/V.  Food Allergies/Intolerances:  None  GI Symptoms: Nausea  Oral Problems: None       Anthropometrics:  Height: 160 cm (5' 3\")   Weight: 51.3 kg (113 lb)   BMI (Calculated): 20.02             Weight History:   Wt Readings from Last 10 Encounters:   05/23/24 51.3 kg (113 lb)   05/23/24 51.3 kg (113 lb)   04/13/24 61.4 kg (135 lb 5.8 oz) -- likely falsely elevated    03/25/24 54.8 kg (120 lb 14.4 oz)   03/07/24 54.4 kg (120 lb)   12/07/23 54.4 kg (120 lb)      Weight Change %:  Weight History / % Weight Change: 5.8% loss in 6 weeks  Significant Weight Loss: No    Dietary Orders (From admission, onward)       Start     Ordered    05/24/24 0001  NPO Diet; Effective midnight  Diet effective midnight         05/23/24 1721                     Estimated Needs:                           Nutrition Diagnosis   Malnutrition Diagnosis  Patient has Malnutrition Diagnosis: No (remains nutritionally at risk however appears pt was optimizing her nutrition status over past ~6 weeks)    Nutrition Diagnosis  Patient has Nutrition Diagnosis: No       Nutrition Interventions/Recommendations         Nutrition Prescription:  regular diet + " ONS as tolerated        Nutrition Interventions:   Interventions: Meals and snacks, Medical food supplement  Meals and Snacks: General healthful diet  Goal: small, frequent meals as tolerated to combat nausea  Medical Food Supplement: Commercial beverage  Goal: order Ensure Plus TID (350 kcals and 13 gm pro per carton) if no plans for discharge today         Nutrition Education: as above               Time Spent/Follow-up Reminder:   Time Spent (min): 30 minutes  Last Date of Nutrition Visit: 05/24/24  Nutrition Follow-Up Needed?: Dietitian to reassess per policy  Follow up Comment: ONS if not discharing today

## 2024-05-24 NOTE — PROGRESS NOTES
Met with patient and introduced myself as Care Coordinator and member of the discharge planning team.  Patient had spine surgery recently and has been having headaches since. She plans to return home with her  at time of discharge. She was independent in ADL's prior to hospitalization. She has at walker at home. Patient used [facility] but is no longer currently active. Her PCP is Dr. Rios. She uses [facility] in [place]. Care Coordinator will continue to follow for home going needs.  Berna Lafleur RN

## 2024-05-24 NOTE — HOSPITAL COURSE
Ashley Mcnair is a 73 y.o. female with a past medical history of GERD, HLD, fibromyalgia, HTN, severe post operative nausea, coronal scoliosis and recent T10-pelvis instrumentation, L1-S1 PCOS, L4/5 L5/S1 TLIF, durotomy primary repair. Patient discharge post operatively but returned with complaints of progressive positional HA with CTH demonstrating small L BG hyper density, MRI with evidence of stable hemorrhage but possible underlying cavernoma. MRI spine with large subfascial pseudomeningocele. Patient admitted for monitoring and has improved.     On the day of discharge, the patient was seen and evaluated by the neurosurgery team and deemed suitable for discharge home. There were no significant events overnight. Vitals were reviewed and within normal limits. Labs were stable at discharge. On day of discharge the patient was tolerating a diet, pain was controlled on PO pain medication, was ambulating well and voiding spontaneously. The patient was given detailed discharge instructions and were scheduled to follow up as an outpatient.

## 2024-05-24 NOTE — PROGRESS NOTES
"Alysa Mcnair is a 73 y.o. female on day 1 of admission presenting with Headache.    Subjective   Patient with mild improvement in HA    Objective     Physical Exam    Last Recorded Vitals  Blood pressure 156/82, pulse 80, temperature 36.3 °C (97.3 °F), temperature source Temporal, resp. rate 16, height 1.6 m (5' 3\"), weight 51.3 kg (113 lb), SpO2 97%.  Intake/Output last 3 Shifts:  No intake/output data recorded.    Relevant Results  Lab Results   Component Value Date    WBC 12.9 (H) 05/23/2024    HGB 13.0 05/23/2024    HCT 39.1 05/23/2024    MCV 92 05/23/2024     (H) 05/23/2024     Lab Results   Component Value Date    GLUCOSE 88 05/23/2024    CALCIUM 9.5 05/23/2024     05/23/2024    K 3.9 05/23/2024    CO2 28 05/23/2024     05/23/2024    BUN 14 05/23/2024    CREATININE 0.57 05/23/2024     Assessment/Plan   Principal Problem:    Headache    72 y/o F w/ h/o GERD, HLD, fibromyalgia, HTN, severe postop nausea, coronal scoliosis, 4/12 s/p T10  - pelvis instrumentation, L1-S1 PCOs, L4/5, L5/S1 TLIF, durotomy primarily repaired, 4/14 drain auto dc'd with stitch, p/w progressive positional HA, CTH small L BG hyperdensity, MRI stable hemorrhage, possible underlying cavernoma, MRI LS large subfascial pseudomeningocele    Floor  IR for blood patch  NPO   SCDs             Pancho Boyd MD      "

## 2024-05-25 VITALS
HEART RATE: 68 BPM | OXYGEN SATURATION: 94 % | DIASTOLIC BLOOD PRESSURE: 88 MMHG | SYSTOLIC BLOOD PRESSURE: 177 MMHG | BODY MASS INDEX: 20.02 KG/M2 | HEIGHT: 63 IN | WEIGHT: 113 LBS | RESPIRATION RATE: 18 BRPM | TEMPERATURE: 97.9 F

## 2024-05-25 PROCEDURE — 2500000001 HC RX 250 WO HCPCS SELF ADMINISTERED DRUGS (ALT 637 FOR MEDICARE OP): Performed by: STUDENT IN AN ORGANIZED HEALTH CARE EDUCATION/TRAINING PROGRAM

## 2024-05-25 PROCEDURE — 2500000004 HC RX 250 GENERAL PHARMACY W/ HCPCS (ALT 636 FOR OP/ED): Performed by: STUDENT IN AN ORGANIZED HEALTH CARE EDUCATION/TRAINING PROGRAM

## 2024-05-25 RX ORDER — ACETAMINOPHEN 325 MG/1
650 TABLET ORAL EVERY 6 HOURS PRN
Qty: 30 TABLET | Refills: 0 | Status: SHIPPED | OUTPATIENT
Start: 2024-05-25 | End: 2024-05-30

## 2024-05-25 RX ADMIN — LOSARTAN POTASSIUM 50 MG: 50 TABLET, FILM COATED ORAL at 08:23

## 2024-05-25 RX ADMIN — ONDANSETRON 4 MG: 2 INJECTION INTRAMUSCULAR; INTRAVENOUS at 06:51

## 2024-05-25 RX ADMIN — TIMOLOL MALEATE 1 DROP: 5 SOLUTION/ DROPS OPHTHALMIC at 08:24

## 2024-05-25 RX ADMIN — SODIUM CHLORIDE 75 ML/HR: 9 INJECTION, SOLUTION INTRAVENOUS at 04:13

## 2024-05-25 RX ADMIN — ACETAMINOPHEN 650 MG: 325 TABLET ORAL at 10:07

## 2024-05-25 ASSESSMENT — PAIN SCALES - GENERAL
PAINLEVEL_OUTOF10: 0 - NO PAIN
PAINLEVEL_OUTOF10: 3

## 2024-05-25 ASSESSMENT — PAIN - FUNCTIONAL ASSESSMENT
PAIN_FUNCTIONAL_ASSESSMENT: 0-10
PAIN_FUNCTIONAL_ASSESSMENT: 0-10

## 2024-05-25 NOTE — NURSING NOTE
Pt discharged home. Blood patch not completed. Pt IV removed tip intact. Home going instructions gone over with patient. Pt with no questions at this time.

## 2024-05-25 NOTE — CARE PLAN
The patient's goals for the shift include sleep     The clinical goals for the shift include Patient will have no decline in neurologic exam overnight.    Over the shift, the patient had a stable exam, had stable vital signs, and was safe and free from injury.  Problem: Pain  Goal: My pain/discomfort is manageable  Outcome: Progressing     Problem: Safety  Goal: Patient will be injury free during hospitalization  Outcome: Progressing  Goal: I will remain free of falls  Outcome: Progressing     Problem: Daily Care  Goal: Daily care needs are met  Outcome: Progressing

## 2024-05-25 NOTE — PROGRESS NOTES
Transitional Care Coordinator Progress Note:     Spoke with patient Alysa Mcnair is a 73 y.o. female on day 2 of admission presenting with Headache. Pt signed IMM at bedside. Pt will be discharged home with no needs.       Diallo Morfin RN TCC

## 2024-05-25 NOTE — PROGRESS NOTES
"Alysa Mcnair is a 73 y.o. female on day 2 of admission presenting with Headache.    Subjective   Patient continues with mild HA, currently 5/10 which is improved from admission. Patient would prefer to go home if she is unable to get blood patch today    Objective     Physical Exam  AOx3  EOMI, FS, TM  BUE 5/5  BLE 5/5  SILT   Incision well healed     Last Recorded Vitals  Blood pressure 151/81, pulse 69, temperature 36.3 °C (97.3 °F), temperature source Temporal, resp. rate 16, height 1.6 m (5' 3\"), weight 51.3 kg (113 lb), SpO2 93%.  Intake/Output last 3 Shifts:  No intake/output data recorded.    Relevant Results  Lab Results   Component Value Date    WBC 12.9 (H) 05/23/2024    HGB 13.0 05/23/2024    HCT 39.1 05/23/2024    MCV 92 05/23/2024     (H) 05/23/2024     Lab Results   Component Value Date    GLUCOSE 88 05/23/2024    CALCIUM 9.5 05/23/2024     05/23/2024    K 3.9 05/23/2024    CO2 28 05/23/2024     05/23/2024    BUN 14 05/23/2024    CREATININE 0.57 05/23/2024     Assessment/Plan   Principal Problem:    Headache    72 y/o F w/ h/o GERD, HLD, fibromyalgia, HTN, severe postop nausea, coronal scoliosis, 4/12 s/p T10  - pelvis instrumentation, L1-S1 PCOs, L4/5, L5/S1 TLIF, durotomy primarily repaired, 4/14 drain auto dc'd with stitch, p/w progressive positional HA, CTH small L BG hyperdensity, MRI stable hemorrhage, possible underlying cavernoma, MRI LS large subfascial pseudomeningocele    Floor  IR for blood patch  NPO   SCDs, hold SQH  Possible discharge in PM pending blood patch and WAHL             Pancho Boyd MD      "

## 2024-05-25 NOTE — DISCHARGE SUMMARY
Discharge Diagnosis  Headache    Issues Requiring Follow-Up  Wound check    Test Results Pending At Discharge  Pending Labs       No current pending labs.            Hospital Course  Ashley Mcnair is a 73 y.o. female with a past medical history of GERD, HLD, fibromyalgia, HTN, severe post operative nausea, coronal scoliosis and recent T10-pelvis instrumentation, L1-S1 PCOS, L4/5 L5/S1 TLIF, durotomy primary repair. Patient discharge post operatively but returned with complaints of progressive positional HA with CTH demonstrating small L BG hyper density, MRI with evidence of stable hemorrhage but possible underlying cavernoma. MRI spine with large subfascial pseudomeningocele. Patient admitted for monitoring and has improved.     On the day of discharge, the patient was seen and evaluated by the neurosurgery team and deemed suitable for discharge home. There were no significant events overnight. Vitals were reviewed and within normal limits. Labs were stable at discharge. On day of discharge the patient was tolerating a diet, pain was controlled on PO pain medication, was ambulating well and voiding spontaneously. The patient was given detailed discharge instructions and were scheduled to follow up as an outpatient.       Pertinent Physical Exam At Time of Discharge  Physical Exam  A&Ox3  Face symmetric  RUE 5/5  LUE 5/5  RLE 5/5  LLE 5/5  Sensation intact to light touch throughout all extremities      Home Medications     Medication List      START taking these medications     acetaminophen 325 mg tablet; Commonly known as: Tylenol; Take 2 tablets   (650 mg) by mouth every 6 hours if needed for mild pain (1 - 3) for up to   5 days.     CONTINUE taking these medications     atorvastatin 10 mg tablet; Commonly known as: Lipitor   brimonidine-timoloL 0.2-0.5 % ophthalmic solution; Commonly known as:   Combigan   calcium carbonate 600 mg calcium (1,500 mg) tablet   cholecalciferol 25 MCG (1000 UT) capsule; Commonly  known as: Vitamin D-3   cranberry 400 mg capsule   cyanocobalamin 1,000 mcg tablet; Commonly known as: Vitamin B-12   cyclobenzaprine 5 mg tablet; Commonly known as: Flexeril; Take 1 tablet   (5 mg) by mouth 3 times a day for 5 days.   Evenity 105 mg/1.17 mL syringe; Generic drug: romosozumab-aqqg   losartan 50 mg tablet; Commonly known as: Cozaar   milk thistle 175 mg tablet   ondansetron ODT 4 mg disintegrating tablet; Commonly known as:   Zofran-ODT; Take 1 tablet (4 mg) by mouth every 8 hours if needed for   nausea or vomiting.   pantoprazole 40 mg EC tablet; Commonly known as: ProtoNix       Outpatient Follow-Up  No future appointments.  If your symptoms continue to persist or worsen, please call the number below to arrange scheduling for a blood patch.  Chronic pain for blood patch: [phone]    Abhinav Wise MD

## 2024-05-28 ENCOUNTER — HOSPITAL ENCOUNTER (EMERGENCY)
Facility: HOSPITAL | Age: 74
Discharge: HOME | End: 2024-05-28
Attending: INTERNAL MEDICINE
Payer: COMMERCIAL

## 2024-05-28 ENCOUNTER — APPOINTMENT (OUTPATIENT)
Dept: RADIOLOGY | Facility: HOSPITAL | Age: 74
End: 2024-05-28
Payer: COMMERCIAL

## 2024-05-28 VITALS
HEART RATE: 79 BPM | RESPIRATION RATE: 18 BRPM | WEIGHT: 112 LBS | TEMPERATURE: 97.8 F | SYSTOLIC BLOOD PRESSURE: 151 MMHG | OXYGEN SATURATION: 96 % | DIASTOLIC BLOOD PRESSURE: 90 MMHG | BODY MASS INDEX: 19.84 KG/M2 | HEIGHT: 63 IN

## 2024-05-28 DIAGNOSIS — G97.1 POST-DURAL PUNCTURE HEADACHE: ICD-10-CM

## 2024-05-28 DIAGNOSIS — R51.9 ACUTE INTRACTABLE HEADACHE, UNSPECIFIED HEADACHE TYPE: Primary | ICD-10-CM

## 2024-05-28 DIAGNOSIS — G96.00 CEREBROSPINAL FLUID LEAK: ICD-10-CM

## 2024-05-28 LAB
ABO GROUP (TYPE) IN BLOOD: NORMAL
ALBUMIN SERPL BCP-MCNC: 4.6 G/DL (ref 3.4–5)
ALP SERPL-CCNC: 133 U/L (ref 33–136)
ALT SERPL W P-5'-P-CCNC: 14 U/L (ref 7–45)
ANION GAP SERPL CALC-SCNC: 17 MMOL/L (ref 10–20)
ANTIBODY SCREEN: NORMAL
APTT PPP: 34 SECONDS (ref 27–38)
AST SERPL W P-5'-P-CCNC: 15 U/L (ref 9–39)
BASOPHILS # BLD AUTO: 0.05 X10*3/UL (ref 0–0.1)
BASOPHILS NFR BLD AUTO: 0.5 %
BILIRUB SERPL-MCNC: 0.7 MG/DL (ref 0–1.2)
BUN SERPL-MCNC: 15 MG/DL (ref 6–23)
CALCIUM SERPL-MCNC: 9.7 MG/DL (ref 8.6–10.3)
CHLORIDE SERPL-SCNC: 100 MMOL/L (ref 98–107)
CO2 SERPL-SCNC: 28 MMOL/L (ref 21–32)
CREAT SERPL-MCNC: 0.56 MG/DL (ref 0.5–1.05)
EGFRCR SERPLBLD CKD-EPI 2021: >90 ML/MIN/1.73M*2
EOSINOPHIL # BLD AUTO: 0.17 X10*3/UL (ref 0–0.4)
EOSINOPHIL NFR BLD AUTO: 1.7 %
ERYTHROCYTE [DISTWIDTH] IN BLOOD BY AUTOMATED COUNT: 13.9 % (ref 11.5–14.5)
GLUCOSE SERPL-MCNC: 85 MG/DL (ref 74–99)
HCT VFR BLD AUTO: 46.1 % (ref 36–46)
HGB BLD-MCNC: 14.6 G/DL (ref 12–16)
IMM GRANULOCYTES # BLD AUTO: 0.04 X10*3/UL (ref 0–0.5)
IMM GRANULOCYTES NFR BLD AUTO: 0.4 % (ref 0–0.9)
INR PPP: 1.1 (ref 0.9–1.1)
LYMPHOCYTES # BLD AUTO: 1.09 X10*3/UL (ref 0.8–3)
LYMPHOCYTES NFR BLD AUTO: 11.1 %
MCH RBC QN AUTO: 30.9 PG (ref 26–34)
MCHC RBC AUTO-ENTMCNC: 31.7 G/DL (ref 32–36)
MCV RBC AUTO: 98 FL (ref 80–100)
MONOCYTES # BLD AUTO: 0.61 X10*3/UL (ref 0.05–0.8)
MONOCYTES NFR BLD AUTO: 6.2 %
NEUTROPHILS # BLD AUTO: 7.84 X10*3/UL (ref 1.6–5.5)
NEUTROPHILS NFR BLD AUTO: 80.1 %
NRBC BLD-RTO: 0 /100 WBCS (ref 0–0)
PLATELET # BLD AUTO: 423 X10*3/UL (ref 150–450)
POTASSIUM SERPL-SCNC: 3.2 MMOL/L (ref 3.5–5.3)
PROT SERPL-MCNC: 7.6 G/DL (ref 6.4–8.2)
PROTHROMBIN TIME: 12.6 SECONDS (ref 9.8–12.8)
RBC # BLD AUTO: 4.72 X10*6/UL (ref 4–5.2)
RH FACTOR (ANTIGEN D): NORMAL
SODIUM SERPL-SCNC: 142 MMOL/L (ref 136–145)
WBC # BLD AUTO: 9.8 X10*3/UL (ref 4.4–11.3)

## 2024-05-28 PROCEDURE — 86901 BLOOD TYPING SEROLOGIC RH(D): CPT

## 2024-05-28 PROCEDURE — 85610 PROTHROMBIN TIME: CPT

## 2024-05-28 PROCEDURE — 84075 ASSAY ALKALINE PHOSPHATASE: CPT

## 2024-05-28 PROCEDURE — 2500000001 HC RX 250 WO HCPCS SELF ADMINISTERED DRUGS (ALT 637 FOR MEDICARE OP)

## 2024-05-28 PROCEDURE — 85025 COMPLETE CBC W/AUTO DIFF WBC: CPT

## 2024-05-28 PROCEDURE — 2500000004 HC RX 250 GENERAL PHARMACY W/ HCPCS (ALT 636 FOR OP/ED)

## 2024-05-28 PROCEDURE — 64483 NJX AA&/STRD TFRM EPI L/S 1: CPT | Performed by: ANESTHESIOLOGY

## 2024-05-28 PROCEDURE — 96374 THER/PROPH/DIAG INJ IV PUSH: CPT

## 2024-05-28 PROCEDURE — 62273 INJECT EPIDURAL PATCH: CPT

## 2024-05-28 PROCEDURE — 99285 EMERGENCY DEPT VISIT HI MDM: CPT | Mod: 25

## 2024-05-28 PROCEDURE — 99222 1ST HOSP IP/OBS MODERATE 55: CPT | Performed by: ANESTHESIOLOGY

## 2024-05-28 PROCEDURE — 36415 COLL VENOUS BLD VENIPUNCTURE: CPT

## 2024-05-28 RX ORDER — ONDANSETRON HYDROCHLORIDE 2 MG/ML
4 INJECTION, SOLUTION INTRAVENOUS ONCE
Status: COMPLETED | OUTPATIENT
Start: 2024-05-28 | End: 2024-05-28

## 2024-05-28 RX ORDER — LORAZEPAM 1 MG/1
1 TABLET ORAL ONCE
Status: COMPLETED | OUTPATIENT
Start: 2024-05-28 | End: 2024-05-28

## 2024-05-28 RX ORDER — ACETAMINOPHEN 325 MG/1
975 TABLET ORAL ONCE
Status: COMPLETED | OUTPATIENT
Start: 2024-05-28 | End: 2024-05-28

## 2024-05-28 RX ADMIN — ONDANSETRON 4 MG: 2 INJECTION INTRAMUSCULAR; INTRAVENOUS at 13:14

## 2024-05-28 RX ADMIN — ACETAMINOPHEN 975 MG: 325 TABLET ORAL at 12:16

## 2024-05-28 RX ADMIN — LORAZEPAM 1 MG: 1 TABLET ORAL at 13:37

## 2024-05-28 ASSESSMENT — PAIN DESCRIPTION - ORIENTATION: ORIENTATION: MID

## 2024-05-28 ASSESSMENT — PAIN DESCRIPTION - LOCATION: LOCATION: BACK

## 2024-05-28 ASSESSMENT — PAIN SCALES - GENERAL: PAINLEVEL_OUTOF10: 5 - MODERATE PAIN

## 2024-05-28 ASSESSMENT — PAIN - FUNCTIONAL ASSESSMENT: PAIN_FUNCTIONAL_ASSESSMENT: 0-10

## 2024-05-28 NOTE — ED PROVIDER NOTES
CC: Post-op Problem     HPI: Ashley Mcnair is an 73 y.o. female with history including GERD, HLD, fibromyalgia, HTN, severe post operative nausea, coronal scoliosis and recent T10-pelvis instrumentation, L1-S1 PCOS, L4/5 L5/S1 TLIF, durotomy primary repair  presenting to the emergency department for worsening headache.  Patient was recently discharged and admitted for concern for spinal leak, where they were hoping to get an IR guided blood patch.  She said that she spoke with a doctor's office and that she was advised to come in for this undergone a blood patch.  She is reporting a headache.  She has not taken anything today for the pain.    Triage note was reviewed and  agree with it  Limitations to History:  none  Additional History Obtained from: discharge summary from admission on 2024    PMHx/PSHx:  Per HPI.   - has a past medical history of Arthritis, Back pain, Chronic pain disorder, Corneal abrasion, Degenerative scoliosis, Fibromyalgia, primary, GERD (gastroesophageal reflux disease), History of shingles, Hyperlipidemia, Hypertension, Nephrolithiasis, PONV (postoperative nausea and vomiting), Radiculopathy, Retinal detachment, Shingles, Spinal stenosis, Tinnitus of left ear, Vertigo, and Vision loss.  - has a past surgical history that includes Hysterectomy; Tonsillectomy; XR foot (Right);  section, low transverse; Eye surgery (Left); and Adenoidectomy.    Social History:  - Tobacco:  reports that she has never smoked. She has never used smokeless tobacco.   - Alcohol:  reports that she does not currently use alcohol.   - Drugs:  reports no history of drug use.     Medications: Reviewed in EMR.     Allergies:  Sulfa (sulfonamide antibiotics)    ???????????????????????????????????????????????????????????????  Triage Vitals:  T 36.6 °C (97.8 °F)  HR 80  BP (!) 192/99  RR 16  O2 97 %      Physical Exam  Vitals and nursing note reviewed.   Constitutional:       General: She is not in acute  distress.  HENT:      Head: Normocephalic and atraumatic.   Eyes:      Conjunctiva/sclera: Conjunctivae normal.   Cardiovascular:      Rate and Rhythm: Normal rate and regular rhythm.   Pulmonary:      Effort: Pulmonary effort is normal. No respiratory distress.      Breath sounds: Normal breath sounds.   Abdominal:      General: There is no distension.      Palpations: Abdomen is soft.   Musculoskeletal:         General: No deformity.      Cervical back: Normal range of motion.   Skin:     General: Skin is warm and dry.   Neurological:      Mental Status: She is alert and oriented to person, place, and time.      Comments: Able to ambulate.    Psychiatric:         Mood and Affect: Mood normal.         Behavior: Behavior normal.     ???????????????????????????????????????????????????????????????      ED Course/Medical Decision Making:    ED Course as of 05/30/24 1356   Tue May 28, 2024   1329 Discussed patient with pain management who were agreeable to take unable to give a time but were saying that once they take care of that she will need to return to lie flat for an hour postprocedure. [JONES]      ED Course User Index  [JONES] Libby Bobby MD         Diagnoses as of 05/30/24 1356   Acute intractable headache, unspecified headache type   Cerebrospinal fluid leak        Patient is a 73 year old female with a recent spinal surgery with durotomy who is presenting for a blood patch placement for a spinal leak.  Preoperative lab work was obtained and unremarkable. Patient was discussed with pain management, IR, anesthesia and neurosurgery to determine who would best be able to obtain a blood patch for the patient. Pain management agreed to place the blood patch while the patient was in the ED for a 1 hour of monitoring and lying flat afterwards. Neurosurgery had no additional recommendation from the blood patch. Patient was given ativan for the procedure. Blood patch was placed. Patient was monitored and told to lie flat  for 1 hour. After one hour patient was comfortable with the plan to discharge. Patient was discharged home in stable condition. Patient was advised to return if symptoms worsen or don’t improve. Patient was advised to follow up with their PCP as needed.       External records reviewed: recent inpatient, clinic, and prior ED notes  Diagnostic imaging independently reviewed/interpreted by me (as reflected in MDM) includes: MRI l spine report was reviewed  Social Determinants Affecting Care: None identified  Discussion of management with other providers: ED attending,  Pain management, anesthesia, neurosurgery, IR  Prescription Drug Consideration: blood patch, given ativan, tylenol and zofran for symptomatic control.  Escalation of Care: none    Pt was seen and discussed with ED attending     Impression:   Spinal surgery complication  Headache  nausea    Disposition: discharge        Procedures ? SmartLinks last updated 5/28/2024 12:19 PM        Libby Bobby MD  Resident  05/30/24 6330

## 2024-05-28 NOTE — CONSULTS
"Consults    Reason For Consult  PDPH    History Of Present Illness  Ashley Mcnair \"Alysa\" is a 73 y.o. female presenting as a consult for post dural puncture headache.  Patient undergone posterior spinal fusion that was complicated by intraoperative dural tear at L4/L5 that was repaired.  Initially had some relief of the headache 2 weeks after the surgery, however about a week ago she noticed worsening headache with tinnitus as well as exacerbation while standing.  Although the headache again seem to be improving with fluids and conservative therapy prompting a second discharge over the weekend, tinnitus became much worse prompting her to again present to the emergency department.  Denies any focal neurological numbness or weakness in her hands or legs, and also denies any vision changes.    Past Medical History  Past Medical History:   Diagnosis Date    Arthritis     Back pain     Right L3-L4 Transforaminal Epidural Steroid injection under fluoroscopic guidance on 23    Chronic pain disorder     Corneal abrasion     Degenerative scoliosis     Fibromyalgia, primary     controlled on mobic    GERD (gastroesophageal reflux disease)     managed with protonix    History of shingles     Hyperlipidemia     f/w pcp, managed with Lipitor    Hypertension     f/w pcp, not currently on medication    Nephrolithiasis     passed naturally    PONV (postoperative nausea and vomiting)     Radiculopathy     Scheduled for surgery with Dr. Whelan on 24    Retinal detachment     Shingles     Spinal stenosis     Tinnitus of left ear     Vertigo     Vision loss     left eye d/t injury       Surgical History  Past Surgical History:   Procedure Laterality Date    ADENOIDECTOMY       SECTION, LOW TRANSVERSE      x3 , ,     EYE SURGERY Left     pupil repair    FOOT Right     HYSTERECTOMY      TONSILLECTOMY          Social History  She reports that she has never smoked. She has never used smokeless tobacco. " "She reports that she does not currently use alcohol. She reports that she does not use drugs.    Family History  Family History   Problem Relation Name Age of Onset    No Known Problems Mother      Heart disease Father          Allergies  Sulfa (sulfonamide antibiotics)    Review of Systems   13 point ROS done and negative except for the above.   Physical Exam    PHYSICAL EXAM  Vitals signs reviewed  Constitutional:    General: Not in acute distress   Appearance: Normal appearance. Not ill-appearing.  HENT:   Head: Normocephalic and atraumatic  Eyes:   Conjunctiva/sclera normal  Cardiovascular:  No jugular venous distention bilaterally  No gross edema in lower extremities  Pulmonary:   Effort: No respiratory distress  Abdominal:  Abdomen appears nondistended  Musculoskeletal:   Moves all extremities equally  Skin:   General: Skin is warm and dry  Neurological:  Flora negative bilaterally  Patellar 2+ bilaterally  Strength 5/5 with extension of bilateral knees, 5/5 with flexion at bilateral elbows  Psychiatric:    Mood and Affect: Mood normal    Behavior: Behavior normal    Last Recorded Vitals  Blood pressure (!) 192/99, pulse 80, temperature 36.6 °C (97.8 °F), temperature source Oral, resp. rate 16, height 1.6 m (5' 3\"), weight 50.8 kg (112 lb), SpO2 97%.    Relevant Results        ASSESSMENT:  Assessment/Plan   Problem List Items Addressed This Visit    None    Patient is a 73-year-old female status post posterior spinal fusion, who presents over a month postoperatively with signs and symptoms concerning for PDPH. L-spine MRI reviewed from 5/23/2024, status post T10-S2 fusion with large CSF seroma measuring 20 cm x 4 cm x 9 cm.  Dural defect appears to occur at L4/L5, where intraoperative repair took place.  Dural sac appears to terminate at the inferior end of S1.  Counseled patient that we could attempt a targeted epidural blood patch either with transforaminal or caudal approach, however given the degree of " the tear and the large CSF seroma this may or may not give her long-term relief.  Patient does not take any blood thinners, platelets 423.    PLAN:  - Will plan for bilateral L4 lumbar transforaminal epidural blood patches versus caudal epidural blood patch.  Risks, benefits and alternatives of the procedure were discussed with the patient who expressed understanding and agrees to proceed.  - Patient will need to lay flat for 1 hour post procedure to allow patch to harden, as well as for neuromonitoring.  - Further counseled patient to avoid any stressful lifting, turning, bending, reaching for the very least 4 weeks in order to prevent dislodgment of the blood patch.  - As long as patient does not have any new or worsening neurological changes, okay for discharge post procedure.    The plan was discussed with the patient and they were invited to contact us back anytime with any questions or concerns and follow-up with us in the office as needed.    Cordell Martinez MD\

## 2024-05-28 NOTE — DISCHARGE INSTRUCTIONS
Please follow up with your pain management physician. Please return if worsening headache, weakness or numbness.

## 2024-05-28 NOTE — PROCEDURES
Preoperative diagnosis/postoperative diagnosis: CSF leak  Procedure: Epidural blood patch under fluoroscopic guidance  Surgeon: Anne-Marie De Luna  Assistant:  Fellow, Aristeo Martinez  Anesthesia: Local, premedication by ED  Complications: Apparently none    Clinical note: Ms. Mcnair is a patient with a history of prior spine surgery with dural repair who unfortunately has persistent leak and a positional headache.  She presents for a blood patch after failing conservative measures.  Depending on spread we discussed doing a transforaminal approach plus or minus a caudal approach.  The patient has a known leak based on recent MRI imaging at the L4-5 level.    Procedure note: The patient was met in the preoperative holding area after risks benefits and alternatives to procedure were discussed with the patient, informed consent was obtained. Patient brought back to the procedure room and placed in the prone position on the fluoroscopy table. Area over the back was exposed, prepped, and draped in the usual sterile fashion.  Skin and subcutaneous tissues to the bilateral foramen at L4 were anesthetized using 0.5% lidocaine.  90 mm 22-gauge spinal needles were inserted first on the left and then right sides to access the L4/5 foramen. Needle tip position was confirmed in AP and lateral view.  Contrast was injected which showed appropriate epidural spread in both AP and lateral views, no intravascular or intrathecal uptake. Attention was taken to the patient's antecubital vein on the right. That area was exposed prepped and draped in the usual sterile fashion. A 21-gauge butterfly needle was used to access the right sided arm vein and 16 mL's of sterile blood was withdrawn. A total of 8 mL's of sterile blood was injected per side. Needle removed, bandage applied, patient tolerated the procedure well with no immediate complications.    We will have the patient lie flat in the ED for an hour.  We discussed that the most occasions  the headache will be resolved after 1 patch however if she has persistent symptoms we can discuss doing a repeat blood patch at a later date if she has any improvement or the patient should follow-up with Dr. Whelan.  I did let her surgeon know that she is here today for a blood patch.

## 2024-05-28 NOTE — ED TRIAGE NOTES
Pt was discharged from [facility] on Saturday and told she needed to come back today to have a blood patch placed at the bottom of her spine d/t a spinal leak that was a result of her having spinal surgery in April.

## 2024-05-28 NOTE — Clinical Note
Ashley Mcnair was seen and treated in our emergency department on 5/28/2024.  She may return to work on 05/29/2024.       If you have any questions or concerns, please don't hesitate to call.      Libby Bobby MD

## 2024-05-30 ENCOUNTER — HOSPITAL ENCOUNTER (OUTPATIENT)
Dept: RADIOLOGY | Facility: HOSPITAL | Age: 74
End: 2024-05-30
Payer: COMMERCIAL

## 2024-06-04 NOTE — ADDENDUM NOTE
Encounter addended by: Diamond Hammond RN on: 6/4/2024 9:46 AM   Actions taken: Imaging Exam ended, Charge Capture section accepted

## 2024-08-27 DIAGNOSIS — M41.55 SCOLIOSIS OF THORACOLUMBAR REGION DUE TO DEGENERATIVE DISEASE OF SPINE IN ADULT: ICD-10-CM

## 2024-08-27 DIAGNOSIS — M41.9 SCOLIOSIS, UNSPECIFIED SCOLIOSIS TYPE, UNSPECIFIED SPINAL REGION: ICD-10-CM

## 2024-08-27 NOTE — PROGRESS NOTES
I just had the pleasure of seeing Ms. Mcnair back in the Neurosurgery Spine Clinic at the [facility]. She is a very pleasant 73 -year-old female, who recently underwent a T10-S1 Fusion with me on 4/12/2024 and is status post 4 months out from her surgery. Today has numbness around the incision area and some pain in her bilateral buttock region.  But overall she feels that she is doing better than before.  Her follow-up was complicated by signs and symptoms from the CSF hypotension from intraoperative CSF leak for which she did underwent an epidural blood patch and those symptoms improved dramatically and at this point of time she is doing very well overall.        Alert and oriented  No apparent distress.  Motor strength baseline with no new weakness.   Sensory exam grossly intact  Gait Normal  Incision well healed.     AP and lateral long cassette scoliosis films shows well placed instrumentation with no evidence of any instrumentation failure.    There are no concerning neurological issues at this point.  At his point of time, we will see her  back in clinic at 1 year from the time of surgery  with an AP and lateral long cassette scoliosis films.  It was a pleasure to participate in Ms. Mcnair  care.   All questions were answered to the patients satisfaction and she explained understanding of the further treatment plan.        Pop Whelan MD, [facility], FA[facility]  Director - Minimally Invasive Spine Surgery  [facility]   of Neurological Surgery  [employer]  [place], [place]    ---Some of this note was completed using Dragon voice recognition technology and sometimes the software misinterprets words. This may include unintended errors with respect to translation of words, typographical errors or grammar errors which may not have been identified prior to finalization of the chart note. Please take this into account when reading  this note---

## 2024-08-29 ENCOUNTER — HOSPITAL ENCOUNTER (OUTPATIENT)
Dept: RADIOLOGY | Facility: HOSPITAL | Age: 74
Discharge: HOME | End: 2024-08-29
Payer: COMMERCIAL

## 2024-08-29 ENCOUNTER — OFFICE VISIT (OUTPATIENT)
Dept: NEUROSURGERY | Facility: CLINIC | Age: 74
End: 2024-08-29
Payer: COMMERCIAL

## 2024-08-29 VITALS
BODY MASS INDEX: 20.91 KG/M2 | DIASTOLIC BLOOD PRESSURE: 90 MMHG | WEIGHT: 118 LBS | RESPIRATION RATE: 20 BRPM | SYSTOLIC BLOOD PRESSURE: 157 MMHG | HEIGHT: 63 IN | HEART RATE: 70 BPM

## 2024-08-29 DIAGNOSIS — M41.9 SCOLIOSIS, UNSPECIFIED SCOLIOSIS TYPE, UNSPECIFIED SPINAL REGION: ICD-10-CM

## 2024-08-29 DIAGNOSIS — Z98.890 S/P SPINAL SURGERY: Primary | ICD-10-CM

## 2024-08-29 DIAGNOSIS — M41.55 SCOLIOSIS OF THORACOLUMBAR REGION DUE TO DEGENERATIVE DISEASE OF SPINE IN ADULT: ICD-10-CM

## 2024-08-29 PROCEDURE — 1036F TOBACCO NON-USER: CPT | Performed by: NEUROLOGICAL SURGERY

## 2024-08-29 PROCEDURE — 99214 OFFICE O/P EST MOD 30 MIN: CPT | Performed by: NEUROLOGICAL SURGERY

## 2024-08-29 PROCEDURE — 1125F AMNT PAIN NOTED PAIN PRSNT: CPT | Performed by: NEUROLOGICAL SURGERY

## 2024-08-29 PROCEDURE — 72082 X-RAY EXAM ENTIRE SPI 2/3 VW: CPT | Performed by: RADIOLOGY

## 2024-08-29 PROCEDURE — 72082 X-RAY EXAM ENTIRE SPI 2/3 VW: CPT

## 2024-08-29 PROCEDURE — 3008F BODY MASS INDEX DOCD: CPT | Performed by: NEUROLOGICAL SURGERY

## 2024-08-29 PROCEDURE — 1159F MED LIST DOCD IN RCRD: CPT | Performed by: NEUROLOGICAL SURGERY

## 2024-08-29 ASSESSMENT — PAIN SCALES - GENERAL: PAINLEVEL: 2

## (undated) DEVICE — MARKER, SKIN, RULER AND LABEL PACK, CUSTOM

## (undated) DEVICE — DRAPE, SHEET, FAN FOLDED, HALF, 44 X 58 IN, DISPOSABLE, LF, STERILE

## (undated) DEVICE — ELECTRODE, ELECTROSURGICAL, BLADE EXT 4 INCH, INSULATED

## (undated) DEVICE — APPLICATOR, CHLORAPREP, W/ORANGE TINT, 26ML

## (undated) DEVICE — CORD, BIPOLAR,  12 FT, DISPOSABLE, LF

## (undated) DEVICE — SUTURE, POLYSORB, 2-0, 18 IN, GS23, DETACHABLE, MULTIPACK, UNDYED

## (undated) DEVICE — Device

## (undated) DEVICE — MANIFOLD, 4 PORT NEPTUNE STANDARD

## (undated) DEVICE — ELECTRODE, ELECTROSURGICAL, BLADE, INSULATED, ENT/IMA, STERILE

## (undated) DEVICE — ELECTRODE, CORKSCREW NEEDLE 1.5M LENGTH

## (undated) DEVICE — NEEDLE, ELECTRODE, SUBDERMAL, PAIRED, 2.0 LEAD, DISP

## (undated) DEVICE — WAX, BONE, 2.5 GM

## (undated) DEVICE — UNID ADAPTIVE SPINE INTELLIGENCE

## (undated) DEVICE — CAUTERY, PENCIL, PUSH BUTTON, SMOKE EVAC, 70MM

## (undated) DEVICE — DRESSING, MEPILEX, FOAM, BORDER FLEX, 6 X 6

## (undated) DEVICE — WOUND SYSTEM, DEBRIDEMENT & CLEANING, O.R DUOPAK

## (undated) DEVICE — DRESSING, MEPILEX, BORDER FLEX, 6 X 8

## (undated) DEVICE — SPONGE, GAUZE, XRAY DECT, 16 PLY, 4 X 4, W/MASTER DMT,STERILE

## (undated) DEVICE — DRESSING, MEPILEX, BORDER FLEX, 3 X 3

## (undated) DEVICE — TIP,  ELECTRODE COATED INSULATED, EXTENDED, LF

## (undated) DEVICE — TUBING, SMOKE EVAC, 3/8 X 10 FT

## (undated) DEVICE — TAPE, SILK, DURAPORE, 3 IN X 10 YD, LF

## (undated) DEVICE — TOOL, MR8 14CM MATCH 3MM

## (undated) DEVICE — SEALANT, HEMOSTATIC, FLOSEAL, 10 ML

## (undated) DEVICE — PAD, GROUNDING, ELECTROSURGICAL, W/9 FT CABLE, POLYHESIVE II, ADULT, LF

## (undated) DEVICE — SKIN CLOSURE SYS, PREMIERPRO EXOFIN, 1-4CM X 22CM, 1.75G TUBE

## (undated) DEVICE — COVER, CART, 45 X 27 X 48 IN, CLEAR

## (undated) DEVICE — BUR,  3MM X 3.8MM, NEURODRILL, LESS AGGR

## (undated) DEVICE — KIT, PATIENT CARE, JACKSON TABLE W/PRONE-SAFE HEADREST

## (undated) DEVICE — SPONGE, HEMOSTATIC, GELATIN, SURGIFOAM, 8 X 12.5 CM X 10 MM

## (undated) DEVICE — ELECTRODE, GROUND PLATE

## (undated) DEVICE — SEALER, BIPOLAR, AQUA MANTYS 6.0

## (undated) DEVICE — BUR, 3MM X 3.8MM, PRECISION, NEURO DRILL

## (undated) DEVICE — SUTURE, VICRYL, 0, 18 IN, UNDYED

## (undated) DEVICE — SUTURE, NUROLON, 4-0, 18 IN, TF, CONTROL RELEASE, MULTIPACK, BLACK

## (undated) DEVICE — SEALANT, DURASEAL EXACT, 5ML

## (undated) DEVICE — DRAIN, WOUND, ROUND, W/TROCAR, HOLE PATTERN, 10 IN, MEDIUM, 1/8 X 49 IN

## (undated) DEVICE — SUTURE, VICRYL, 0, 18 IN, CT-1, UNDYED

## (undated) DEVICE — BONE WAX, YELLOW 2.5G

## (undated) DEVICE — EVACUATOR, WOUND, CLOSED, 3 SPRING, 400 CC, Y CONNECTING TUBE